# Patient Record
Sex: FEMALE | Race: WHITE | NOT HISPANIC OR LATINO | Employment: OTHER | URBAN - NONMETROPOLITAN AREA
[De-identification: names, ages, dates, MRNs, and addresses within clinical notes are randomized per-mention and may not be internally consistent; named-entity substitution may affect disease eponyms.]

---

## 2018-03-06 ENCOUNTER — OFFICE VISIT CONVERTED (OUTPATIENT)
Dept: FAMILY MEDICINE CLINIC | Age: 63
End: 2018-03-06
Attending: FAMILY MEDICINE

## 2018-04-11 ENCOUNTER — OFFICE VISIT (OUTPATIENT)
Dept: OBSTETRICS AND GYNECOLOGY | Age: 63
End: 2018-04-11

## 2018-04-11 VITALS
WEIGHT: 168 LBS | DIASTOLIC BLOOD PRESSURE: 74 MMHG | SYSTOLIC BLOOD PRESSURE: 124 MMHG | BODY MASS INDEX: 27 KG/M2 | HEIGHT: 66 IN

## 2018-04-11 DIAGNOSIS — K21.9 GASTROESOPHAGEAL REFLUX DISEASE, ESOPHAGITIS PRESENCE NOT SPECIFIED: ICD-10-CM

## 2018-04-11 DIAGNOSIS — Z01.419 ENCOUNTER FOR GYNECOLOGICAL EXAMINATION: Primary | ICD-10-CM

## 2018-04-11 DIAGNOSIS — Z00.00 ANNUAL PHYSICAL EXAM: ICD-10-CM

## 2018-04-11 DIAGNOSIS — M85.89 OSTEOPENIA OF MULTIPLE SITES: ICD-10-CM

## 2018-04-11 DIAGNOSIS — N94.19 DYSPAREUNIA DUE TO MEDICAL CONDITION IN FEMALE: ICD-10-CM

## 2018-04-11 PROCEDURE — 99396 PREV VISIT EST AGE 40-64: CPT | Performed by: OBSTETRICS & GYNECOLOGY

## 2018-04-11 RX ORDER — ESTRADIOL 0.1 MG/G
2 CREAM VAGINAL DAILY
Qty: 42.5 G | Refills: 1 | Status: SHIPPED | OUTPATIENT
Start: 2018-04-11 | End: 2023-02-27

## 2018-04-11 NOTE — PROGRESS NOTES
Subjective   Sharon Tolliver is a 63 y.o. female is being seen today for   Chief Complaint   Patient presents with   • Gynecologic Exam     Annual:mammo here today   .    History of Present Illness  Patient is here for an annual check.  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  The good news is she has met a new significant other been sexually active but there is some pain with sex over talked about that and I'll give her some Estrace cream I gave her sample told her how to take it.  Otherwise she doing fine with no really other complaints no change in medicines her bowels and bladder are working well and is no new family history  The following portions of the patient's history were reviewed and updated as appropriate: allergies, current medications, past family history, past medical history, past social history, past surgical history and problem list.    Vitals:    18 1015   BP: 124/74       PAST MEDICAL HISTORY  Past Medical History:   Diagnosis Date   • Chronic tension headaches    • Reflux esophagitis      OB History      Para Term  AB Living    2 2 2       SAB TAB Ectopic Molar Multiple Live Births                 Past Surgical History:   Procedure Laterality Date   • BREAST BIOPSY     •  SECTION     • COLONOSCOPY     • ESOPHAGOGASTRECTOMY       Family History   Problem Relation Age of Onset   • No Known Problems Mother    • No Known Problems Father    • No Known Problems Sister    • No Known Problems Brother    • No Known Problems Daughter    • No Known Problems Son    • No Known Problems Maternal Grandmother    • No Known Problems Paternal Grandmother    • No Known Problems Maternal Aunt    • No Known Problems Paternal Aunt    • BRCA 1/2 Neg Hx    • Breast cancer Neg Hx    • Colon cancer Neg Hx    • Endometrial cancer Neg Hx    • Ovarian cancer Neg Hx      History    Smoking Status   • Never Smoker   Smokeless Tobacco   • Never Used       Current Outpatient Prescriptions:   •  pantoprazole (PROTONIX) 40 MG EC tablet, , Disp: , Rfl:   •  sertraline (ZOLOFT) 50 MG tablet, , Disp: , Rfl:   •  estradiol (ESTRACE VAGINAL) 0.1 MG/GM vaginal cream, Insert 2 g into the vagina Daily., Disp: 42.5 g, Rfl: 1    There is no immunization history on file for this patient.    Review of Systems   Constitutional: Negative for chills, fatigue, fever and unexpected weight change.   Respiratory: Negative for shortness of breath and wheezing.    Cardiovascular: Negative for chest pain.   Gastrointestinal: Negative for abdominal distention, abdominal pain, blood in stool, constipation, diarrhea and nausea.   Genitourinary: Positive for dyspareunia. Negative for difficulty urinating, dysuria, frequency, hematuria, menstrual problem, pelvic pain, urgency and vaginal discharge.   Skin: Negative for rash.       Objective   Physical Exam   Constitutional: She is oriented to person, place, and time. Vital signs are normal. She appears well-developed and well-nourished.   Neck: No thyromegaly present.   Cardiovascular: Normal rate, regular rhythm and normal heart sounds.    Pulmonary/Chest: Effort normal. Right breast exhibits no inverted nipple, no mass, no nipple discharge, no skin change and no tenderness. Left breast exhibits no inverted nipple, no mass, no nipple discharge, no skin change and no tenderness. Breasts are symmetrical. There is no breast swelling.   Abdominal: Soft.   Genitourinary: Vagina normal and uterus normal. No breast tenderness, discharge or bleeding. Pelvic exam was performed with patient supine. No labial fusion. There is no rash, tenderness, lesion or injury on the right labia. There is no rash, tenderness, lesion or injury on the left labia. Cervix exhibits no motion tenderness, no discharge and no friability. Right adnexum displays no mass, no tenderness and no fullness. Left  adnexum displays no mass, no tenderness and no fullness.   Neurological: She is alert and oriented to person, place, and time.   Psychiatric: She has a normal mood and affect.   Vitals reviewed.        Assessment/Plan   Sharon was seen today for gynecologic exam.    Diagnoses and all orders for this visit:    Encounter for gynecological examination  -     IGP, Apt HPV,rfx 16 / 18,45 - ThinPrep Vial, Cervix    Gastroesophageal reflux disease, esophagitis presence not specified    Annual physical exam    Osteopenia of multiple sites    Dyspareunia due to medical condition in female    Other orders  -     estradiol (ESTRACE VAGINAL) 0.1 MG/GM vaginal cream; Insert 2 g into the vagina Daily.      My exam was normal today.  I did a Pap smear today.  Mammogram was done today.  Also did a bone density which showed a little worsening of her osteopenia whereupon repeat that in about 2-3 years.  Talked about that and the things to do for that.  Colonoscopy is up-to-date it was done in 15.  Diet and excise were discussed come back in year

## 2018-04-16 LAB
CYTOLOGIST CVX/VAG CYTO: NORMAL
CYTOLOGY CVX/VAG DOC THIN PREP: NORMAL
DX ICD CODE: NORMAL
HIV 1 & 2 AB SER-IMP: NORMAL
HPV I/H RISK 4 DNA CVX QL PROBE+SIG AMP: NEGATIVE
Lab: NORMAL
OTHER STN SPEC: NORMAL
PATH REPORT.FINAL DX SPEC: NORMAL
STAT OF ADQ CVX/VAG CYTO-IMP: NORMAL

## 2018-11-19 ENCOUNTER — OFFICE VISIT CONVERTED (OUTPATIENT)
Dept: FAMILY MEDICINE CLINIC | Age: 63
End: 2018-11-19
Attending: FAMILY MEDICINE

## 2019-02-22 ENCOUNTER — HOSPITAL ENCOUNTER (OUTPATIENT)
Dept: OTHER | Facility: HOSPITAL | Age: 64
Discharge: HOME | End: 2019-02-22
Attending: FAMILY MEDICINE

## 2019-02-22 LAB
ALT SERPL-CCNC: 10 U/L (ref 10–40)
CHOLEST SERPL-MCNC: 152 MG/DL (ref 107–200)
CHOLEST/HDLC SERPL: 2.7 {RATIO} (ref 3–6)
HDLC SERPL-MCNC: 56 MG/DL (ref 40–60)
LDLC SERPL CALC-MCNC: 78 MG/DL (ref 70–100)
TRIGL SERPL-MCNC: 90 MG/DL (ref 40–150)
VLDLC SERPL-MCNC: 18 MG/DL (ref 5–37)

## 2019-05-10 ENCOUNTER — OFFICE VISIT (OUTPATIENT)
Dept: OBSTETRICS AND GYNECOLOGY | Age: 64
End: 2019-05-10

## 2019-05-10 ENCOUNTER — PROCEDURE VISIT (OUTPATIENT)
Dept: OBSTETRICS AND GYNECOLOGY | Age: 64
End: 2019-05-10

## 2019-05-10 ENCOUNTER — APPOINTMENT (OUTPATIENT)
Dept: WOMENS IMAGING | Facility: HOSPITAL | Age: 64
End: 2019-05-10

## 2019-05-10 VITALS
DIASTOLIC BLOOD PRESSURE: 80 MMHG | BODY MASS INDEX: 27 KG/M2 | SYSTOLIC BLOOD PRESSURE: 138 MMHG | HEIGHT: 66 IN | WEIGHT: 168 LBS

## 2019-05-10 DIAGNOSIS — E78.00 HYPERCHOLESTEREMIA: ICD-10-CM

## 2019-05-10 DIAGNOSIS — Z12.31 VISIT FOR SCREENING MAMMOGRAM: Primary | ICD-10-CM

## 2019-05-10 DIAGNOSIS — Z00.00 ANNUAL PHYSICAL EXAM: Primary | ICD-10-CM

## 2019-05-10 PROCEDURE — 77067 SCR MAMMO BI INCL CAD: CPT | Performed by: RADIOLOGY

## 2019-05-10 PROCEDURE — 99396 PREV VISIT EST AGE 40-64: CPT | Performed by: OBSTETRICS & GYNECOLOGY

## 2019-05-10 PROCEDURE — 77067 SCR MAMMO BI INCL CAD: CPT | Performed by: OBSTETRICS & GYNECOLOGY

## 2019-05-10 RX ORDER — ATORVASTATIN CALCIUM 10 MG/1
10 TABLET, FILM COATED ORAL DAILY
COMMUNITY
Start: 2019-03-04 | End: 2021-08-25 | Stop reason: SDUPTHER

## 2019-05-10 RX ORDER — ZOSTER VACCINE RECOMBINANT, ADJUVANTED 50 MCG/0.5
KIT INTRAMUSCULAR
COMMUNITY
Start: 2019-05-09 | End: 2022-02-25 | Stop reason: SDUPTHER

## 2019-05-10 NOTE — PROGRESS NOTES
Subjective   Sharon Tolliver is a 64 y.o. female is being seen today for   Chief Complaint   Patient presents with   • Gynecologic Exam     AE and MG today.  2018 neg pap, neg HPV.  Dexa 2018 = osteopenia.  PM uses vaginal estradiol cream.  C-scope  or 2016  = normal, repeat in ten years.  New dx elevated cholesterol, started lipitor 10 mg this year.    .    History of Present Illness  Patient is here for her annual exam.  She is doing very well overall no changes during the year no medical problems.  Nothing new in the family bowels and bladder work well.  Still with her friend doing well with that and uses her Estrace cream which has helped a lot.  Still no job at this point but she is okay with that right now  The following portions of the patient's history were reviewed and updated as appropriate: allergies, current medications, past family history, past medical history, past social history, past surgical history and problem list.    Vitals:    05/10/19 1316   BP: 138/80       PAST MEDICAL HISTORY  Past Medical History:   Diagnosis Date   • Chronic tension headaches    • Diverticulosis    • Elevated cholesterol    • Reflux esophagitis      OB History      Para Term  AB Living    2 2 2     2    SAB TAB Ectopic Molar Multiple Live Births              2        Past Surgical History:   Procedure Laterality Date   • BREAST BIOPSY     •  SECTION     • COLONOSCOPY  2016    normal repeat in ten years.    • ESOPHAGOGASTRECTOMY     • UPPER GASTROINTESTINAL ENDOSCOPY       Family History   Problem Relation Age of Onset   • No Known Problems Mother    • No Known Problems Father    • No Known Problems Sister    • No Known Problems Brother    • No Known Problems Son    • No Known Problems Maternal Grandmother    • No Known Problems Paternal Grandmother    • No Known Problems Maternal Aunt    • No Known Problems Paternal Aunt    • No Known Problems Son    • BRCA 1/2 Neg Hx    • Breast cancer Neg  Hx    • Colon cancer Neg Hx    • Endometrial cancer Neg Hx    • Ovarian cancer Neg Hx      Social History     Tobacco Use   Smoking Status Never Smoker   Smokeless Tobacco Never Used       Current Outpatient Medications:   •  atorvastatin (LIPITOR) 10 MG tablet, Take 10 mg by mouth Daily., Disp: , Rfl:   •  estradiol (ESTRACE VAGINAL) 0.1 MG/GM vaginal cream, Insert 2 g into the vagina Daily., Disp: 42.5 g, Rfl: 1  •  pantoprazole (PROTONIX) 40 MG EC tablet, , Disp: , Rfl:   •  sertraline (ZOLOFT) 50 MG tablet, Take 50 mg by mouth Daily., Disp: , Rfl:   •  SHINGRIX 50 MCG/0.5ML reconstituted suspension, , Disp: , Rfl:     There is no immunization history on file for this patient.    Review of Systems   Constitutional: Negative for chills, fatigue, fever and unexpected weight change.   Respiratory: Negative for shortness of breath and wheezing.    Cardiovascular: Negative for chest pain.   Gastrointestinal: Negative for abdominal distention, abdominal pain, blood in stool, constipation, diarrhea and nausea.   Genitourinary: Negative for difficulty urinating, dyspareunia, dysuria, frequency, hematuria, menstrual problem, pelvic pain, urgency and vaginal discharge.   Skin: Negative for rash.       Objective   Physical Exam   Constitutional: She is oriented to person, place, and time. Vital signs are normal. She appears well-developed and well-nourished.   Neck: No thyromegaly present.   Cardiovascular: Normal rate, regular rhythm and normal heart sounds.   Pulmonary/Chest: Effort normal. Right breast exhibits no inverted nipple, no mass, no nipple discharge, no skin change and no tenderness. Left breast exhibits no inverted nipple, no mass, no nipple discharge, no skin change and no tenderness. Breasts are symmetrical. There is no breast swelling.   Abdominal: Soft.   Genitourinary: Vagina normal and uterus normal. No breast tenderness, discharge or bleeding. Pelvic exam was performed with patient supine. No labial  fusion. There is no rash, tenderness, lesion or injury on the right labia. There is no rash, tenderness, lesion or injury on the left labia. Cervix exhibits no motion tenderness, no discharge and no friability. Right adnexum displays no mass, no tenderness and no fullness. Left adnexum displays no mass, no tenderness and no fullness.   Neurological: She is alert and oriented to person, place, and time.   Psychiatric: She has a normal mood and affect.   Vitals reviewed.        Assessment/Plan   Sharon was seen today for gynecologic exam.    Diagnoses and all orders for this visit:    Annual physical exam    Hypercholesteremia      Normal exam today.  Pap smear done last year so not done today.  Mammogram was done today.  Bone density last year showed osteopenia will get one next year and colonoscopy was in 15 she is good for 10 years.  She thought she gained weight but she is the same as last year but she would like to lose and we can we talked about exercise etc.  Back in a year

## 2019-05-31 ENCOUNTER — OFFICE VISIT CONVERTED (OUTPATIENT)
Dept: FAMILY MEDICINE CLINIC | Age: 64
End: 2019-05-31
Attending: FAMILY MEDICINE

## 2019-09-26 ENCOUNTER — OFFICE VISIT CONVERTED (OUTPATIENT)
Dept: FAMILY MEDICINE CLINIC | Age: 64
End: 2019-09-26
Attending: FAMILY MEDICINE

## 2019-09-28 ENCOUNTER — HOSPITAL ENCOUNTER (OUTPATIENT)
Dept: OTHER | Facility: HOSPITAL | Age: 64
Discharge: HOME | End: 2019-09-28
Attending: FAMILY MEDICINE

## 2019-09-28 LAB
ALBUMIN SERPL-MCNC: 4.2 G/DL (ref 3.5–5)
ALBUMIN/GLOB SERPL: 1.4 {RATIO} (ref 1.4–2.6)
ALP SERPL-CCNC: 77 U/L (ref 43–160)
ALT SERPL-CCNC: 14 U/L (ref 10–40)
ANION GAP SERPL CALC-SCNC: 19 MMOL/L (ref 8–19)
AST SERPL-CCNC: 20 U/L (ref 15–50)
BASOPHILS # BLD MANUAL: 0.02 10*3/UL (ref 0–0.2)
BASOPHILS NFR BLD MANUAL: 0.2 % (ref 0–3)
BILIRUB SERPL-MCNC: 0.49 MG/DL (ref 0.2–1.3)
BUN SERPL-MCNC: 12 MG/DL (ref 5–25)
BUN/CREAT SERPL: 12 {RATIO} (ref 6–20)
CALCIUM SERPL-MCNC: 9.3 MG/DL (ref 8.7–10.4)
CHLORIDE SERPL-SCNC: 103 MMOL/L (ref 99–111)
CHOLEST SERPL-MCNC: 141 MG/DL (ref 107–200)
CHOLEST/HDLC SERPL: 2.6 {RATIO} (ref 3–6)
CONV CO2: 22 MMOL/L (ref 22–32)
CONV TOTAL PROTEIN: 7.2 G/DL (ref 6.3–8.2)
CREAT UR-MCNC: 0.99 MG/DL (ref 0.5–0.9)
DEPRECATED RDW RBC AUTO: 40.9 FL
EOSINOPHIL # BLD MANUAL: 0.06 10*3/UL (ref 0–0.7)
EOSINOPHIL NFR BLD MANUAL: 0.6 % (ref 0–7)
ERYTHROCYTE [DISTWIDTH] IN BLOOD BY AUTOMATED COUNT: 12.4 % (ref 11.5–14.5)
GFR SERPLBLD BASED ON 1.73 SQ M-ARVRAT: 60 ML/MIN/{1.73_M2}
GLOBULIN UR ELPH-MCNC: 3 G/DL (ref 2–3.5)
GLUCOSE SERPL-MCNC: 91 MG/DL (ref 65–99)
GRANS (ABSOLUTE): 8.28 10*3/UL (ref 2–8)
GRANS: 83.7 % (ref 30–85)
HBA1C MFR BLD: 13.4 G/DL (ref 12–16)
HCT VFR BLD AUTO: 41.6 % (ref 37–47)
HDLC SERPL-MCNC: 54 MG/DL (ref 40–60)
IMM GRANULOCYTES # BLD: 0.01 10*3/UL (ref 0–0.54)
IMM GRANULOCYTES NFR BLD: 0.1 % (ref 0–0.43)
LDLC SERPL CALC-MCNC: 71 MG/DL (ref 70–100)
LYMPHOCYTES # BLD MANUAL: 0.92 10*3/UL (ref 1–5)
LYMPHOCYTES NFR BLD MANUAL: 6.1 % (ref 3–10)
MCH RBC QN AUTO: 28.4 PG (ref 27–31)
MCHC RBC AUTO-ENTMCNC: 32.2 G/DL (ref 33–37)
MCV RBC AUTO: 88.1 FL (ref 81–99)
MONOCYTES # BLD AUTO: 0.6 10*3/UL (ref 0.2–1.2)
OSMOLALITY SERPL CALC.SUM OF ELEC: 289 MOSM/KG (ref 273–304)
PLATELET # BLD AUTO: 203 10*3/UL (ref 130–400)
PMV BLD AUTO: 9.7 FL (ref 7.4–10.4)
POTASSIUM SERPL-SCNC: 4.2 MMOL/L (ref 3.5–5.3)
RBC # BLD AUTO: 4.72 10*6/UL (ref 4.2–5.4)
SODIUM SERPL-SCNC: 140 MMOL/L (ref 135–147)
TRIGL SERPL-MCNC: 81 MG/DL (ref 40–150)
TSH SERPL-ACNC: 1.08 M[IU]/L (ref 0.27–4.2)
VARIANT LYMPHS NFR BLD MANUAL: 9.3 % (ref 20–45)
VLDLC SERPL-MCNC: 16 MG/DL (ref 5–37)
WBC # BLD AUTO: 9.89 10*3/UL (ref 4.8–10.8)

## 2020-05-19 ENCOUNTER — OFFICE VISIT CONVERTED (OUTPATIENT)
Dept: FAMILY MEDICINE CLINIC | Age: 65
End: 2020-05-19
Attending: FAMILY MEDICINE

## 2020-09-28 ENCOUNTER — OFFICE VISIT CONVERTED (OUTPATIENT)
Dept: FAMILY MEDICINE CLINIC | Age: 65
End: 2020-09-28
Attending: FAMILY MEDICINE

## 2020-10-03 ENCOUNTER — HOSPITAL ENCOUNTER (OUTPATIENT)
Dept: OTHER | Facility: HOSPITAL | Age: 65
Discharge: HOME | End: 2020-10-03
Attending: FAMILY MEDICINE

## 2020-10-03 LAB
ALBUMIN SERPL-MCNC: 4.3 G/DL (ref 3.5–5)
ALBUMIN/GLOB SERPL: 1.4 {RATIO} (ref 1.4–2.6)
ALP SERPL-CCNC: 70 U/L (ref 43–160)
ALT SERPL-CCNC: 13 U/L (ref 10–40)
ANION GAP SERPL CALC-SCNC: 16 MMOL/L (ref 8–19)
AST SERPL-CCNC: 18 U/L (ref 15–50)
BASOPHILS # BLD AUTO: 0.02 10*3/UL (ref 0–0.2)
BASOPHILS NFR BLD AUTO: 0.4 % (ref 0–3)
BILIRUB SERPL-MCNC: 0.43 MG/DL (ref 0.2–1.3)
BUN SERPL-MCNC: 18 MG/DL (ref 5–25)
BUN/CREAT SERPL: 17 {RATIO} (ref 6–20)
CALCIUM SERPL-MCNC: 9.4 MG/DL (ref 8.7–10.4)
CHLORIDE SERPL-SCNC: 103 MMOL/L (ref 99–111)
CHOLEST SERPL-MCNC: 174 MG/DL (ref 107–200)
CHOLEST/HDLC SERPL: 2.8 {RATIO} (ref 3–6)
CONV ABS IMM GRAN: 0.01 10*3/UL (ref 0–0.2)
CONV CO2: 25 MMOL/L (ref 22–32)
CONV IMMATURE GRAN: 0.2 % (ref 0–1.8)
CONV TOTAL PROTEIN: 7.3 G/DL (ref 6.3–8.2)
CREAT UR-MCNC: 1.09 MG/DL (ref 0.5–0.9)
DEPRECATED RDW RBC AUTO: 42 FL (ref 36.4–46.3)
EOSINOPHIL # BLD AUTO: 0.07 10*3/UL (ref 0–0.7)
EOSINOPHIL # BLD AUTO: 1.4 % (ref 0–7)
ERYTHROCYTE [DISTWIDTH] IN BLOOD BY AUTOMATED COUNT: 12.7 % (ref 11.7–14.4)
GFR SERPLBLD BASED ON 1.73 SQ M-ARVRAT: 53 ML/MIN/{1.73_M2}
GLOBULIN UR ELPH-MCNC: 3 G/DL (ref 2–3.5)
GLUCOSE SERPL-MCNC: 87 MG/DL (ref 65–99)
HCT VFR BLD AUTO: 43.5 % (ref 37–47)
HDLC SERPL-MCNC: 62 MG/DL (ref 40–60)
HGB BLD-MCNC: 13.3 G/DL (ref 12–16)
LDLC SERPL CALC-MCNC: 100 MG/DL (ref 70–100)
LYMPHOCYTES # BLD AUTO: 1.62 10*3/UL (ref 1–5)
LYMPHOCYTES NFR BLD AUTO: 33.1 % (ref 20–45)
MCH RBC QN AUTO: 27.9 PG (ref 27–31)
MCHC RBC AUTO-ENTMCNC: 30.6 G/DL (ref 33–37)
MCV RBC AUTO: 91.2 FL (ref 81–99)
MONOCYTES # BLD AUTO: 0.41 10*3/UL (ref 0.2–1.2)
MONOCYTES NFR BLD AUTO: 8.4 % (ref 3–10)
NEUTROPHILS # BLD AUTO: 2.76 10*3/UL (ref 2–8)
NEUTROPHILS NFR BLD AUTO: 56.5 % (ref 30–85)
NRBC CBCN: 0 % (ref 0–0.7)
OSMOLALITY SERPL CALC.SUM OF ELEC: 289 MOSM/KG (ref 273–304)
PLATELET # BLD AUTO: 213 10*3/UL (ref 130–400)
PMV BLD AUTO: 10.7 FL (ref 9.4–12.3)
POTASSIUM SERPL-SCNC: 4.5 MMOL/L (ref 3.5–5.3)
RBC # BLD AUTO: 4.77 10*6/UL (ref 4.2–5.4)
SODIUM SERPL-SCNC: 139 MMOL/L (ref 135–147)
TRIGL SERPL-MCNC: 58 MG/DL (ref 40–150)
TSH SERPL-ACNC: 1.91 M[IU]/L (ref 0.27–4.2)
VLDLC SERPL-MCNC: 12 MG/DL (ref 5–37)
WBC # BLD AUTO: 4.89 10*3/UL (ref 4.8–10.8)

## 2020-10-06 ENCOUNTER — TELEPHONE (OUTPATIENT)
Dept: OBSTETRICS AND GYNECOLOGY | Age: 65
End: 2020-10-06

## 2020-10-06 NOTE — TELEPHONE ENCOUNTER
Former Sonya Pt    Pt called to schedule AE and requesting Dr. Gaming.     Dr. Gaming, are you able to accept this pt and have them scheduled with you?    [phone]

## 2020-10-07 NOTE — TELEPHONE ENCOUNTER
I can see her in my next available annual spot - please put new patient/Sonya per JOSE in notes on schedule please

## 2021-05-14 ENCOUNTER — APPOINTMENT (OUTPATIENT)
Dept: WOMENS IMAGING | Facility: HOSPITAL | Age: 66
End: 2021-05-14

## 2021-05-14 PROCEDURE — 77067 SCR MAMMO BI INCL CAD: CPT | Performed by: RADIOLOGY

## 2021-05-14 PROCEDURE — 77063 BREAST TOMOSYNTHESIS BI: CPT | Performed by: RADIOLOGY

## 2021-05-18 NOTE — PROGRESS NOTES
Sharon TolliverEliana 1955     Office/Outpatient Visit    Visit Date: Thu, Sep 26, 2019 04:13 pm    Provider: Emigdio Mcwilliams MD (Assistant: Karina Neal MA)    Location: [facility]        Electronically signed by Emigdio Mcwilliams MD on  2019 08:03:02 AM                             SUBJECTIVE:        CC: physical exam         HPI:     Sharon is in today for wellness exam.  She is 64 years old and [place] in [place] where she has been since  of this very year.  She does not smoke.  She drinks almost no alcohol.  She is up to date on screening mammogram and colonoscopy.  She does need to have some blood work done.  She remains on cholesterol lowering medication at this time.     ROS:     CONSTITUTIONAL:  Negative for chills and fever.      CARDIOVASCULAR:  Negative for chest pain and palpitations.      RESPIRATORY:  Negative for recent cough and dyspnea.      GASTROINTESTINAL:  Negative for abdominal pain, nausea and vomiting.      INTEGUMENTARY/BREAST:  Negative for atypical mole(s) and rash.          [facility]/:     Last Reviewed on 2019 11:34 AM by Emigdio Mcwilliams    Past Medical History:                 PAST MEDICAL HISTORY         Gastroesophageal Reflux Disease     Migraine Headaches     Depression    Anxiety         PREVENTIVE HEALTH MAINTENANCE             BONE DENSITY: was last done 3-4 years ago osteopenia     COLORECTAL CANCER SCREENING: Up to date (colonoscopy q10y; sigmoidoscopy q5y; Cologuard q3y) was last done 2014, Results are in chart; colonoscopy with normal results; The next colonoscopy is due  2024; Diverticulosis     MAMMOGRAM: Done within last 2 years and results in are chart was last done 2019 with normal results     PAP SMEAR: was last done 2016 with normal results         Surgical History:         Cholecystectomy    : X 2;         Family History:     Father: Alzheimer's Disease; Cerebrovascular Accident     Mother:  Osteoarthritis         Social History:     Occupation: Unemployed     Marital Status:      Children: 2 children         Tobacco/Alcohol/Supplements:     Last Reviewed on 5/31/2019 11:34 AM by Emigdio Mcwilliams    Tobacco: She has never smoked.  Non-drinker         Substance Abuse History:     Last Reviewed on 5/31/2019 11:34 AM by Emigdio Mcwilliams    NEGATIVE         Mental Health History:     Last Reviewed on 5/31/2019 11:34 AM by Emigdio Mcwilliams        Communicable Diseases (eg STDs):     Last Reviewed on 5/31/2019 11:34 AM by Emigdio Mcwilliams            Current Problems:     Last Reviewed on 5/31/2019 11:34 AM by Emigdio Mcwilliams    Abnormal laboratory test findings without diagnosis     Elevated cholesterol     Common migraine     Depression     GERD         Immunizations:     Havrix -adult dose (HepA) 5/23/2018     Fluvirin (4 + years dose) 10/1/2014     Fluzone (3 + years dose) 10/1/2018     Influenza Virus Vaccine pf (adult) 11/15/2017     Influenza Virus Vaccine, unspecified formulation 10/1/2016     Shingrix (Zoster recombinant) 5/9/2019         Allergies:     Last Reviewed on 5/31/2019 11:34 AM by Emigdio Mcwilliams      No Known Drug Allergies.         Current Medications:     Last Reviewed on 5/31/2019 11:34 AM by Emigdio Mcwilliams    Lipitor 10mg Tablet Take 1 tablet(s) by mouth q hs     Zoloft 50mg Tablet one and one-half  q day     Imitrex 100mg Tablet Take 1 tablet(s) by mouth prn for migraine, maximum of two tablets in one 24hour period     Pantoprazole 40mg Tablets, Delayed Release Take 1 tablet(s) by mouth daily     Estradiol 0.1mg/1gm Vaginal Cream one applicator 1 times a week         OBJECTIVE:        Vitals:         Current: 9/26/2019 4:17:12 PM    Ht:  5 ft, 6.5 in;  Wt: 170.6 lbs;  BMI: 27.1    T: 97.8 F (oral);  BP: 144/81 mm Hg (right arm, sitting);  P: 72 bpm (right arm (BP Cuff), sitting);  sCr: 1.06 mg/dL;  GFR: 57.94        Repeat:      4:49:25 PM     BP:   132/88mm Hg (right arm, sitting)         Exams:     PHYSICAL EXAM:     GENERAL: vital signs recorded - well developed, well nourished;  no apparent distress;     EYES: extraocular movements intact; conjunctiva and cornea are normal; PERRL;     E/N/T:  normal EACs, TMs, nasal/oral mucosa, teeth, gingiva, and oropharynx;     NECK: range of motion is normal; thyroid is non-palpable;     RESPIRATORY: normal respiratory rate and pattern with no distress; normal breath sounds with no rales, rhonchi, wheezes or rubs;     CARDIOVASCULAR: normal rate; rhythm is regular;  no systolic murmur; no edema;     GASTROINTESTINAL: nontender; normal bowel sounds; no organomegaly;     LYMPHATIC: no enlargement of cervical or facial nodes; no supraclavicular nodes;     BREAST/INTEGUMENT: SKIN: no significant rashes or lesions; no suspicious moles;     NEUROLOGIC: mental status: alert and oriented x 3; cranial nerves II-XII grossly intact;     PSYCHIATRIC: appropriate affect and demeanor; normal psychomotor function;         ASSESSMENT           V70.0   Z00.00  Yearly physical exam              DDx:         ORDERS:         Meds Prescribed:       Refill of: Lipitor (Atorvastatin Calcium) 10mg Tablet Take 1 tablet(s) by mouth q hs  #90 (Ninety) tablet(s) Refills: 3       Refill of: Zoloft (Sertraline HCl) 50mg Tablet one and one-half  q day  #135 (One [employer] and [address]) tablet(s) Refills: 3         Radiology/Test Orders:       3014F  Screening mammography results documented and reviewed (PV)1  (In-House)         3017F  Colorectal CA screen results documented and reviewed (PV)  (In-House)           Lab Orders:       [id number]  [facility] PHYSICAL: CMP, CBC, TSH, LIPID: [id number], [id number], [id number], [id number]  (Send-Out)           Other Orders:         Depression screen negative  (In-House)                   PLAN:          Yearly physical exam     LABORATORY:  Labs ordered to be performed today include PHYSICAL PANEL; CMP,  CBC, TSH, LIPID.      RECOMMENDATIONS given include: Today, we have reviewed Sharon's care.  She remains in good health overall.  We will repeat her blood pressure prior to leaving.  She will have fasting labs at her convenience.  No changes are anticipated..  MIPS PHQ-9 Depression Screening: Completed form scanned and in chart; Total Score 1; Negative Depression Screen           Orders:       [id number]  [facility] PHYSICAL: CMP, CBC, TSH, LIPID: [id number], [id number], [id number], [id number]  (Send-Out)           Depression screen negative  (In-House)         3014F  Screening mammography results documented and reviewed (PV)1  (In-House)         3017F  Colorectal CA screen results documented and reviewed (PV)  (In-House)             Patient Education Handouts:       Physical Exam 60+ year, Female              Other Prescriptions:       Refill of: Lipitor (Atorvastatin Calcium) 10mg Tablet Take 1 tablet(s) by mouth q hs  #90 (Ninety) tablet(s) Refills: 3       Refill of: Zoloft (Sertraline HCl) 50mg Tablet one and one-half  q day  #135 (One [place] [address]) tablet(s) Refills: 3         CHARGE CAPTURE           **Please note: ICD descriptions below are intended for billing purposes only and may not represent clinical diagnoses**        Primary Diagnosis:         V70.0 Yearly physical exam            Z00.00    Encntr for general adult medical exam w/o abnormal findings              Orders:          [id number]   Preventive medicine, established patient, age 40-64 years  (In-House)                Depression screen negative  (In-House)             3014F   Screening mammography results documented and reviewed (PV)1  (In-House)             3017F   Colorectal CA screen results documented and reviewed (PV)  (In-House)

## 2021-05-18 NOTE — PROGRESS NOTES
Unruly Sharon J  1955     Office/Outpatient Visit    Visit Date: Tue, May 19, 2020 02:13 pm    Provider: Emigdio Mcwilliams MD (Assistant: Sonia Glasgow LPN)    Location: [facility]        Electronically signed by Emigdio Mcwilliams MD on  05/20/2020 06:53:38 AM                             Subjective:        CC: concern about returning to work        TELEMEDICINE VISIT:    - Sharon consented to this telemedicine visit.    - Persons present during the telemedicine consultation include:  Sharon - patient, Dr. Mcwilliams    - This visit is being conducted over telephone with    HPI:       Sharon wanted to have some discussion about possible return to work.  She is scheduled to return to work next week and is concerned.  She is concerned in part because of her age.  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  They are also checking temperatures and symptoms on entry.    ROS:     CONSTITUTIONAL:  Negative for chills and fever.      CARDIOVASCULAR:  Negative for chest pain and palpitations.      RESPIRATORY:  Negative for recent cough and dyspnea.      GASTROINTESTINAL:  Negative for abdominal pain, nausea and vomiting.      INTEGUMENTARY/BREAST:  Negative for atypical mole(s) and rash.          Past Medical History / Family History / Social History:         Last Reviewed on 5/19/2020 02:25 PM by Emigdio Mcwilliams    Past Medical History:                 PAST MEDICAL HISTORY         Gastroesophageal Reflux Disease     Migraine Headaches     Depression    Anxiety         PREVENTIVE HEALTH MAINTENANCE             BONE DENSITY: was last done 3-4 years ago osteopenia     COLORECTAL CANCER SCREENING: Up to date (colonoscopy q10y; sigmoidoscopy q5y; Cologuard q3y) was last done 06/2014, Results are in chart; colonoscopy with normal results; The next colonoscopy is due  06/2024; Diverticulosis     MAMMOGRAM: Done within last 2 years and results  in are chart was last done 2019 with normal results     PAP SMEAR: was last done 2016 with normal results         Surgical History:         Cholecystectomy    : X 2;         Family History:     Father: Alzheimer's Disease; Cerebrovascular Accident     Mother: Osteoarthritis         Social History:     Occupation: Unemployed     Marital Status:      Children: 2 children         Tobacco/Alcohol/Supplements:     Last Reviewed on 2020 02:25 PM by Emigdio Mcwilliams    Tobacco: She has never smoked.  Non-drinker         Substance Abuse History:     Last Reviewed on 2020 02:25 PM by Emigdio Mcwilliams    NEGATIVE         Mental Health History:     Last Reviewed on 2020 02:25 PM by Emigdio Mcwilliams        Communicable Diseases (eg STDs):     Last Reviewed on 2020 02:25 PM by Emigdio Mcwilliams        Current Problems:     Last Reviewed on 2020 02:25 PM by Emigdio Mcwilliams    Gastro-esophageal reflux disease without esophagitis    Major depressive disorder, single episode, mild    Migraine without aura, not intractable, without status migrainosus    Hyperlipidemia, unspecified    Abnormal finding of blood chemistry, unspecified        Immunizations:     Havrix -adult dose (HepA) 2018    Fluvirin (4 + years dose) 10/1/2014    Fluzone (3 + years dose) 10/1/2018    Influenza Virus Vaccine pf (adult) 11/15/2017    Influenza Virus Vaccine, unspecified formulation 10/1/2016    Shingrix (Zoster recombinant) 2019    Influenza, high dose seasonal 10/23/2019        Allergies:     Last Reviewed on 2020 02:25 PM by Emigdio Mcwilliams    No Known Allergies.        Current Medications:     Last Reviewed on 2020 02:25 PM by Emigdio Mcwilliams    Imitrex 100mg Tablet [Take 1 tablet(s) by mouth prn for migraine, maximum of two tablets in one 24hour period]    Zoloft 50mg Tablet [one and one-half  q day]    Estradiol 0.1mg/1gm Vaginal Cream  [one applicator 1 times a week]    Lipitor 10mg Tablet [Take 1 tablet(s) by mouth q hs]        Assessment:         Z00.8   Encounter for other general examination           Plan:         Encounter for other general examination        RECOMMENDATIONS given include: Today, we have reviewed this situation.  We discussed the potential risks of return to work.  I have stressed to her that she should wear a mask and maintain distance as possible in her work place.  We agreed that she would not work this week and assess the situtation.  Again, her main risk factor for severe disease with COVID-19 is age.  She expressed understanding..  Total time spent was 7 minutes; 10310--Xxfmhngzu E/M 5-10 minutes             Charge Capture:         Primary Diagnosis:     Z00.8  Encounter for other general examination           Orders:      [id number]  Phys/QHP telephone evaluation 5-10 min  (In-House)

## 2021-05-18 NOTE — PROGRESS NOTES
Sharon Tolliver J  1955     Office/Outpatient Visit    Visit Date: Mon, Sep 28, 2020 03:53 pm    Provider: Emigdio Mcwilliams MD (Assistant: Radha Elder RN)    Location: [facility]        Electronically signed by Emigdio Mcwilliams MD on  09/30/2020 11:02:40 AM                             Subjective:        CC: physical exam, cholesterol, depression, migraine headaches    HPI:       Sharon is in today for wellness exam.  She is 65 years old and [place] in [place] where she has been since June of 2019.  She does not smoke.  She drinks almost no alcohol.  She is up to date on screening mammogram and colonoscopy.          Sharon also has a history of depression for which she remains on sertraline.  She has been doing well emotionally.  She currently takes 75mg daily of this.  She has been on this dose for about 5 years.  She was going through a difficult season at that time.  She may be interested in weaning off of this over time.            Sharon also has history of migraine headaches for which she has previously used Imitrex.  She uses this only rarely though.          Concerning hyperlipidemia, unspecified, current treatment includes Lipitor.  Compliance with treatment has been good; she takes her medication as directed and follows up as directed.  She denies experiencing any hypercholesterolemia related symptoms.      ROS:     CONSTITUTIONAL:  Negative for chills and fever.      CARDIOVASCULAR:  Negative for chest pain and palpitations.      RESPIRATORY:  Negative for recent cough and dyspnea.      GASTROINTESTINAL:  Negative for abdominal pain, nausea and vomiting.      INTEGUMENTARY/BREAST:  Negative for atypical mole(s) and rash.          Past Medical History / Family History / Social History:         Last Reviewed on 5/19/2020 02:25 PM by Emigdio Mcwilliams    Past Medical History:                 PAST MEDICAL HISTORY         Gastroesophageal Reflux Disease     Migraine  Headaches     Depression    Anxiety         PREVENTIVE HEALTH MAINTENANCE             BONE DENSITY: was last done 3-4 years ago osteopenia     COLORECTAL CANCER SCREENING: Up to date (colonoscopy q10y; sigmoidoscopy q5y; Cologuard q3y) was last done 2014, Results are in chart; colonoscopy with normal results; The next colonoscopy is due  2024; Diverticulosis     MAMMOGRAM: Done within last 2 years and results in are chart was last done 2019 with normal results     PAP SMEAR: was last done 2016 with normal results         Surgical History:         Cholecystectomy    : X 2;         Family History:     Father: Alzheimer's Disease; Cerebrovascular Accident     Mother: Osteoarthritis         Social History:     Occupation: Unemployed     Marital Status:      Children: 2 children         Tobacco/Alcohol/Supplements:     Last Reviewed on 2020 02:25 PM by Emigdio Mcwilliams    Tobacco: She has never smoked.  Non-drinker         Substance Abuse History:     Last Reviewed on 2020 02:25 PM by Emigdio Mcwilliams    NEGATIVE         Mental Health History:     Last Reviewed on 2020 02:25 PM by Emigdio Mcwilliams        Communicable Diseases (eg STDs):     Last Reviewed on 2020 02:25 PM by Emigdio Mcwilliams        Current Problems:     Last Reviewed on 2020 02:25 PM by Emigdio Mcwilliams    Gastro-esophageal reflux disease without esophagitis    Major depressive disorder, single episode, mild    Migraine without aura, not intractable, without status migrainosus    Hyperlipidemia, unspecified    Abnormal finding of blood chemistry, unspecified    Encounter for other general examination        Immunizations:     Influenza, high dose seasonal 10/23/2019    Havrix -adult dose (HepA) 2018    Fluvirin (4 + years dose) 10/1/2014    Fluzone (3 + years dose) 10/1/2018    Influenza Virus Vaccine pf (adult) 11/15/2017    Influenza Virus Vaccine, unspecified  formulation 10/1/2016    Shingrix (Zoster recombinant) 5/9/2019        Allergies:     Last Reviewed on 5/19/2020 02:25 PM by Emigdio Mcwilliams    No Known Allergies.        Current Medications:     Last Reviewed on 5/19/2020 02:25 PM by Emigdio Mcwilliams    Imitrex 100mg Tablet [Take 1 tablet(s) by mouth prn for migraine, maximum of two tablets in one 24hour period]    Zoloft 50mg Tablet [one and one-half  q day]    Estradiol 0.1mg/1gm Vaginal Cream [one applicator 1 times a week]    Lipitor 10mg Tablet [Take 1 tablet(s) by mouth q hs]        Objective:        Vitals:         Current: 9/28/2020 3:59:18 PM    Ht:  5 ft, 6.5 in;  Wt: 171.8 lbs;  BMI: 27.3T: 96.9 F (temporal);  BP: 117/76 mm Hg (right arm, sitting);  P: 67 bpm (right arm (BP Cuff), sitting);  sCr: 0.99 mg/dL;  GFR: 61.42        Exams:     PHYSICAL EXAM:     GENERAL: vital signs recorded - well developed, well nourished;  no apparent distress;     EYES: extraocular movements intact; conjunctiva and cornea are normal; PERRL;     E/N/T:  normal EACs, TMs, nasal/oral mucosa, teeth, gingiva, and oropharynx;     NECK: range of motion is normal; thyroid is non-palpable;     RESPIRATORY: normal respiratory rate and pattern with no distress; normal breath sounds with no rales, rhonchi, wheezes or rubs;     CARDIOVASCULAR: normal rate; rhythm is regular;  no systolic murmur; no edema;     GASTROINTESTINAL: nontender; normal bowel sounds; no organomegaly;     LYMPHATIC: no enlargement of cervical or facial nodes; no supraclavicular nodes;     BREAST/INTEGUMENT: SKIN: no significant rashes or lesions; no suspicious moles;     NEUROLOGIC: mental status: alert and oriented x 3; cranial nerves II-XII grossly intact;     PSYCHIATRIC: appropriate affect and demeanor; normal psychomotor function;         Assessment:         Z00.01   Encounter for general adult medical examination with abnormal findings       F32.0   Major depressive disorder, single episode,  mild       G43.009   Migraine without aura, not intractable, without status migrainosus       E78.5   Hyperlipidemia, unspecified       Z23   Encounter for immunization           ORDERS:         Meds Prescribed:       [Refilled] Lipitor 10 mg oral tablet [Take 1 tablet(s) by mouth q hs], #90 (ninety) tablets, Refills: 3 (three)       [Refilled] Zoloft 50 mg oral tablet [one and one-half  q day], #135 (one hundred and thirty five) tablets, Refills: 3 (three)         Radiology/Test Orders:       3017F  Colorectal CA screen results documented and reviewed (PV)  (In-House)              Lab Orders:       [id number]  [facility] PHYSICAL: CMP, CBC, TSH, LIPID: [id number], [id number], [id number], [id number]  (Send-Out)              Procedures Ordered:       71230  Fluzone High Dose  (In-House)            36821  Immunization administration; one vaccine  (In-House)              Other Orders:         Depression screen negative  (In-House)            1101F  Pt screen for fall risk; document no falls in past year or only 1 fall w/o injury in past year (ALEKSANDER)  (In-House)              Screening mammogram results documented  (Send-Out)            1123F  Advance Care Planning discussed and doc; advance care plan or surrogate decision maker doc. in MR  (Send-Out)                      Plan:         Encounter for general adult medical examination with abnormal findings    LABORATORY:  Labs ordered to be performed today include PHYSICAL PANEL; CMP, CBC, TSH, LIPID.      RECOMMENDATIONS given include: Today, we have again reviewed her care.  I'm going to refill her medications and update labs.  She is basically up to date on screenings, but I did recommend she get mammogram at her convenience.  Otherwise, no short term changes are anticipated.  I am going to refill sertraline for her, but I did ask her to try to slowly come off of it.  I would recommend she take only a whole tab daily for the next month or so, and then a half tab daily for a similar  amount of time.  If she comes off of it fine.  If not, then she will continue it as is..  MIPS PHQ-9 Depression Screening: Completed form scanned and in chart; Total Score 1; Negative Depression Screen           Orders:       99387  [facility] PHYSICAL: CMP, CBC, TSH, LIPID: [id number], [id number], [id number], [id number]  (Send-Out)              Depression screen negative  (In-House)            1101F  Pt screen for fall risk; document no falls in past year or only 1 fall w/o injury in past year (ALEKSANDER)  (In-House)              Screening mammogram results documented  (Send-Out)            3017F  Colorectal CA screen results documented and reviewed (PV)  (In-House)            1123F  Advance Care Planning discussed and doc; advance care plan or surrogate decision maker doc. in MR  (Send-Out)              Major depressive disorder, single episode, mildAs above.          Prescriptions:       [Refilled] Lipitor 10 mg oral tablet [Take 1 tablet(s) by mouth q hs], #90 (ninety) tablets, Refills: 3 (three)       [Refilled] Zoloft 50 mg oral tablet [one and one-half  q day], #135 (one hundred and thirty five) tablets, Refills: 3 (three)         Migraine without aura, not intractable, without status migrainosusAs above.        Hyperlipidemia, unspecifiedAs above.        Encounter for immunization          Immunizations:       39351  Fluzone High Dose  (In-House)                Dose (ml): 0.7  Site: left deltoid  Route: intramuscular  Administered by: Radha Elder          : [employer]  Lot #: [id number]  Exp: 06/30/2021          NDC: [address]-85      50657  Immunization administration; one vaccine  (In-House)                  Charge Capture:         Primary Diagnosis:     Z00.01  Encounter for general adult medical examination with abnormal findings           Orders:      63068  Preventive medicine, established patient, age 65+ years  (In-House)              Depression screen negative  (In-House)            1101F   Pt screen for fall risk; document no falls in past year or only 1 fall w/o injury in past year (ALEKSANDER)  (In-House)            3017F  Colorectal CA screen results documented and reviewed (PV)  (In-House)              F32.0  Major depressive disorder, single episode, mild     G43.009  Migraine without aura, not intractable, without status migrainosus     E78.5  Hyperlipidemia, unspecified     Z23  Encounter for immunization           Orders:      94026  Fluzone High Dose  (In-House)            56127  Immunization administration; one vaccine  (In-House)

## 2021-05-18 NOTE — PROGRESS NOTES
Unruly Sharon J. 1955     Office/Outpatient Visit    Visit Date: Tue, Mar 6, 2018 09:42 am    Provider: Emigdio Mcwilliams MD (Assistant: Neda Gonzales MA)    Location: [facility]        Electronically signed by Emigdio Mcwilliams MD on  2018 02:41:44 PM                             SUBJECTIVE:        CC: cough         HPI:         Cough noted.  It has been present for the past one week.  Respiratory symptoms include chest congestion and cough.  She denies body aches and fever.  Sputum is described as scant.  She reports recent exposure to illness from co-workers.  She was actually feeling better on  and then got worse yesterday.     ROS:     CONSTITUTIONAL:  Negative for chills and fever.      CARDIOVASCULAR:  Negative for chest pain and palpitations.      GASTROINTESTINAL:  Negative for abdominal pain, nausea and vomiting.          [facility]/:     Last Reviewed on 3/06/2018 09:58 AM by Emigdio Mcwilliams    Past Medical History:                 PAST MEDICAL HISTORY         Gastroesophageal Reflux Disease     Migraine Headaches     Depression    Anxiety         PREVENTIVE HEALTH MAINTENANCE             BONE DENSITY: was last done 3-4 years ago osteopenia     COLORECTAL CANCER SCREENING: Up to date (colonoscopy q10y; sigmoidoscopy q5y; Cologuard q3y) was last done 2014, Results are in chart; colonoscopy with normal results; The next colonoscopy is due  2024; Diverticulosis     MAMMOGRAM: Done within last 2 years and results in are chart was last done 10/20/16 with normal results     PAP SMEAR: was last done 2016 with normal results         Surgical History:         Cholecystectomy    : X 2;         Family History:         Positive for Alzheimer's Disease ( father ) and Cerebrovascular Accident ( father ).      Positive for Osteoarthritis ( mother ).          Social History:     Occupation: [employer]     Marital Status:      Children: 2 children          Tobacco/Alcohol/Supplements:     Last Reviewed on 3/06/2018 09:58 AM by Emigdio Mcwilliams    Tobacco: She has never smoked.  Non-drinker         Substance Abuse History:     Last Reviewed on 3/06/2018 09:58 AM by Emigdio Mcwilliams    NEGATIVE         Mental Health History:     Last Reviewed on 3/06/2018 09:58 AM by Emigdio Mcwilliams        Communicable Diseases (eg STDs):     Last Reviewed on 3/06/2018 09:58 AM by Emigdio Mcwilliams            Current Problems:     Last Reviewed on 3/06/2018 09:58 AM by Emigdio Mcwilliams    Abnormal laboratory test findings without diagnosis     Elevated cholesterol     Depression     Cough         Immunizations:     Fluvirin (4 + years dose) 10/1/2014     Influenza Virus Vaccine pf (adult) 11/15/2017     Influenza Virus Vaccine, unspecified formulation 10/1/2016         Allergies:     Last Reviewed on 3/06/2018 09:58 AM by Emigdio Mcwilliams      No Known Drug Allergies.         Current Medications:     Last Reviewed on 3/06/2018 09:58 AM by Emigdio Mcwilliams    Imitrex 100mg Tablet Take 1 tablet(s) by mouth prn for migraine, maximum of two tablets in one 24hour period     Pantoprazole 40mg Tablets, Delayed Release Take 1 tablet(s) by mouth daily     Zoloft 50mg Tablet one and one-half  q day         OBJECTIVE:        Vitals:         Current: 3/6/2018 9:44:00 AM    Ht:  5 ft, 6.5 in;  Wt: 166.3 lbs;  BMI: 26.4    T: 99.1 F (oral);  BP: 133/72 mm Hg (left arm, sitting);  P: 102 bpm (left arm (BP Cuff), sitting);  sCr: 1 mg/dL;  GFR: 61.52        Exams:     PHYSICAL EXAM:     GENERAL: vital signs recorded - well developed, well nourished;  no apparent distress;     EYES: extraocular movements intact; conjunctiva and cornea are normal; PERRLA;     E/N/T:  normal EACs, TMs, nasal/oral mucosa, teeth, gingiva, and oropharynx;     NECK: range of motion is normal; thyroid is non-palpable;     RESPIRATORY: normal respiratory rate and pattern with no  distress; normal breath sounds with no rales, rhonchi, wheezes or rubs; she has a deep - almost barking cough     CARDIOVASCULAR: normal rate; rhythm is regular;  no systolic murmur; no edema;     GASTROINTESTINAL: nontender; normal bowel sounds; no organomegaly;     BREAST/INTEGUMENT: SKIN: no significant rashes or lesions; no suspicious moles;         Lab/Test Results:             Influenza A and B:  Negative (03/06/2018),     Performed by::  tls (03/06/2018),             ASSESSMENT           786.2   R05  Cough              DDx:         ORDERS:         Meds Prescribed:       Cefdinir 300mg Capsules Take 1 capsule(s) by mouth q12h for 10 days  #20 (Twenty) capsule(s) Refills: 0       Prednisone 20mg Tablet Take  2 tablet(s) by mouth qd  #10 (Ten) tablet(s) Refills: 0       Promethazine with Dextromethrophan 6.25mg/15mg per 5ml Syrup Take 1 to 2 teaspoon by mouth q 4 to 6 hr as needed for cough  #4 (Four) oz Refills: 1         Lab Orders:       [id number]  Infectious agent antigen detection by immunoassay; Influenza  (In-House)         72320-12  Infectious agent antigen detection by immunoassay; Influenza  (In-House)                   PLAN:          Cough     LABORATORY:  Labs ordered to be performed today include Flu A&B Flu A Flu B.      RECOMMENDATIONS given include: We will cover Sharon as noted below.  I think this is becoming more progressive.  We will use antibiotics because of that.  If she has the flu, we will call her..            Prescriptions:       Cefdinir 300mg Capsules Take 1 capsule(s) by mouth q12h for 10 days  #20 (Twenty) capsule(s) Refills: 0       Prednisone 20mg Tablet Take  2 tablet(s) by mouth qd  #10 (Ten) tablet(s) Refills: 0       Promethazine with Dextromethrophan 6.25mg/15mg per 5ml Syrup Take 1 to 2 teaspoon by mouth q 4 to 6 hr as needed for cough  #4 (Four) oz Refills: 1           Orders:       28951  Infectious agent antigen detection by immunoassay; Influenza  (In-House)          20175-51  Infectious agent antigen detection by immunoassay; Influenza  (In-House)             Patient Education Handouts:       Acute Bronchitis              CHARGE CAPTURE           **Please note: ICD descriptions below are intended for billing purposes only and may not represent clinical diagnoses**        Primary Diagnosis:         786.2 Cough            R05    Cough              Orders:          13748   Office/outpatient visit; established patient, level 3  (In-House)             32935   Infectious agent antigen detection by immunoassay; Influenza  (In-House)             50893 -59  Infectious agent antigen detection by immunoassay; Influenza  (In-House)

## 2021-05-18 NOTE — PROGRESS NOTES
Sharon Tolliver 1955     Office/Outpatient Visit    Visit Date: Fri, May 31, 2019 11:07 am    Provider: Emigdio Mcwilliams MD (Assistant: Radha Elder RN)    Location: [facility]        Electronically signed by Emigdio Mcwilliams MD on  05/31/2019 07:16:52 PM                             SUBJECTIVE:        CC: cholesterol, GERD, migraine headaches         HPI:         Ms. Tolliver presents with elevated cholesterol.  Current treatment includes Lipitor.  Compliance with treatment has been good; she takes her medication as directed and follows up as directed.  She denies experiencing any hypercholesterolemia related symptoms.          She also has history of GERD for which she remains on Protonix.  She denies acute issue with this presently.  She is trying to move over to ranitidine.         She does have history of migraine headaches.  However, those have been doing well for her most recently.  She does take Imitrex as needed for this.  It has been 2-3 months since she has used this though.     ROS:     CONSTITUTIONAL:  Negative for chills and fever.      CARDIOVASCULAR:  Negative for chest pain and palpitations.      RESPIRATORY:  Negative for recent cough and dyspnea.      GASTROINTESTINAL:  Negative for abdominal pain, nausea and vomiting.          [facility]/SH:     Last Reviewed on 5/31/2019 11:34 AM by Emigdio Mcwilliams    Past Medical History:                 PAST MEDICAL HISTORY         Gastroesophageal Reflux Disease     Migraine Headaches     Depression    Anxiety         PREVENTIVE HEALTH MAINTENANCE             BONE DENSITY: was last done 3-4 years ago osteopenia     COLORECTAL CANCER SCREENING: Up to date (colonoscopy q10y; sigmoidoscopy q5y; Cologuard q3y) was last done 06/2014, Results are in chart; colonoscopy with normal results; The next colonoscopy is due  06/2024; Diverticulosis     MAMMOGRAM: Done within last 2 years and results in are chart was last done 04/2018 with normal  results     PAP SMEAR: was last done 2016 with normal results         Surgical History:         Cholecystectomy    : X 2;         Family History:     Father: Alzheimer's Disease; Cerebrovascular Accident     Mother: Osteoarthritis         Social History:     Occupation: Unemployed     Marital Status:      Children: 2 children         Tobacco/Alcohol/Supplements:     Last Reviewed on 2019 11:34 AM by Emigdio Mcwilliams    Tobacco: She has never smoked.  Non-drinker         Substance Abuse History:     Last Reviewed on 2019 11:34 AM by Emigdio Mcwilliams    NEGATIVE         Mental Health History:     Last Reviewed on 2019 11:34 AM by Emigdio Mcwilliams        Communicable Diseases (eg STDs):     Last Reviewed on 2019 11:34 AM by Emigdio Mcwilliams            Current Problems:     Last Reviewed on 2019 11:34 AM by Emigdio Mcwilliams    Abnormal laboratory test findings without diagnosis     Elevated cholesterol     Depression         Immunizations:     Havrix -adult dose (HepA) 2018     Fluvirin (4 + years dose) 10/1/2014     Fluzone (3 + years dose) 10/1/2018     Influenza Virus Vaccine pf (adult) 11/15/2017     Influenza Virus Vaccine, unspecified formulation 10/1/2016     Shingrix (Zoster recombinant) 2019         Allergies:     Last Reviewed on 2019 11:34 AM by Emigdio Mcwilliams      No Known Drug Allergies.         Current Medications:     Last Reviewed on 2019 11:34 AM by Emigdio Mcwilliams    Lipitor 10mg Tablet Take 1 tablet(s) by mouth q hs     Imitrex 100mg Tablet Take 1 tablet(s) by mouth prn for migraine, maximum of two tablets in one 24hour period     Pantoprazole 40mg Tablets, Delayed Release Take 1 tablet(s) by mouth daily     Zoloft 50mg Tablet one and one-half  q day     Estradiol 0.1mg/1gm Vaginal Cream one applicator 1 times a week         OBJECTIVE:        Vitals:         Current: 2019 11:11:07 AM     Ht:  5 ft, 6.5 in;  Wt: 166.2 lbs;  BMI: 26.4    T: 98.4 F (oral);  BP: 111/71 mm Hg (right arm, sitting);  P: 76 bpm (right arm (BP Cuff), sitting);  sCr: 1.06 mg/dL;  GFR: 57.30        Exams:     PHYSICAL EXAM:     GENERAL: vital signs recorded - well developed, well nourished;  no apparent distress;     EYES: extraocular movements intact; conjunctiva and cornea are normal; PERRLA;     E/N/T:  normal EACs, TMs, nasal/oral mucosa, teeth, gingiva, and oropharynx;     NECK: range of motion is normal; thyroid is non-palpable;     RESPIRATORY: normal respiratory rate and pattern with no distress; normal breath sounds with no rales, rhonchi, wheezes or rubs;     CARDIOVASCULAR: normal rate; rhythm is regular;  no systolic murmur; no edema;     GASTROINTESTINAL: nontender; normal bowel sounds; no organomegaly;     LYMPHATIC: no enlargement of cervical or facial nodes; no supraclavicular nodes;     BREAST/INTEGUMENT: SKIN: no significant rashes or lesions; no suspicious moles;     NEUROLOGIC: mental status: alert and oriented x 3; cranial nerves II-XII grossly intact;     PSYCHIATRIC: appropriate affect and demeanor; normal psychomotor function;         ASSESSMENT           272.0   E78.5  Elevated cholesterol              DDx:     530.81   K21.9  GERD              DDx:     346.10   G43.009  Common migraine              DDx:     296.20   F32.0  Depression              DDx:         ORDERS:         Meds Prescribed:       Refill of: Lipitor (Atorvastatin Calcium) 10mg Tablet Take 1 tablet(s) by mouth q hs  #90 (Ninety) tablet(s) Refills: 3       Refill of: Zoloft (Sertraline HCl) 50mg Tablet one and one-half  q day  #135 (One [place] and Thirty Five) tablet(s) Refills: 3         Radiology/Test Orders:       3014F  Screening mammography results documented and reviewed (PV)1  (In-House)         3017F  Colorectal CA screen results documented and reviewed (PV)  (In-House)                   PLAN:          Elevated cholesterol          RECOMMENDATIONS given include: Sharon seems well today.  We will update refills.  She did have blood work in February that looked good.  We will send her needed medications for a year and contact her in January for wellness exam..  MIPS Vaccines Flu and Pneumonia updated in Shot record     MAMMOGRAM: Done within last 2 years and results in are chart     COLORECTAL CANCER SCREENING: Results are in chart           Prescriptions:       Refill of: Lipitor (Atorvastatin Calcium) 10mg Tablet Take 1 tablet(s) by mouth q hs  #90 (Ninety) tablet(s) Refills: 3           Orders:       3014F  Screening mammography results documented and reviewed (PV)1  (In-House)         3017F  Colorectal CA screen results documented and reviewed (PV)  (In-House)             Patient Education Handouts:       Hyperlipidemia (Hyperlipoproteinemia)           GERD As above.          Common migraine As above.          Depression           Prescriptions:       Refill of: Zoloft (Sertraline HCl) 50mg Tablet one and one-half  q day  #135 (One [place] and Thirty Five) tablet(s) Refills: 3             CHARGE CAPTURE           **Please note: ICD descriptions below are intended for billing purposes only and may not represent clinical diagnoses**        Primary Diagnosis:         272.0 Elevated cholesterol            E78.5    Hyperlipidemia, unspecified              Orders:          56252   Office/outpatient visit; established patient, level 4  (In-House)             3014F   Screening mammography results documented and reviewed (PV)1  (In-House)             3017F   Colorectal CA screen results documented and reviewed (PV)  (In-House)           530.81 GERD            K21.9    Gastro-esophageal reflux disease without esophagitis    346.10 Common migraine            G43.009    Migraine without aura, not intractable, without status migrainosus    296.20 Depression            F32.0    Major depressive disorder, single episode, mild

## 2021-05-18 NOTE — PROGRESS NOTES
Sharon Tolliver 1955     Office/Outpatient Visit    Visit Date:  09:29 am    Provider: Emigdio Mcwilliams MD (Assistant: Radha Elder RN)    Location: [facility]        Electronically signed by Emigdio Mcwilliams MD on  2018 06:27:16 AM                             SUBJECTIVE:        CC: physical exam         HPI:     Sharon is in today for wellness exam.  She is 63 years old and works at [employer].  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  She will only be there another 3 weeks.  She does not smoke or use alcohol.  She had colonoscopy in  which showed some diverticulosis.  She remains up to date on mammogram which she has done through her gynecologist in [place].     ROS:     CONSTITUTIONAL:  Negative for chills and fever.      CARDIOVASCULAR:  Negative for chest pain and palpitations.      RESPIRATORY:  Negative for recent cough and dyspnea.      GASTROINTESTINAL:  Negative for abdominal pain, nausea and vomiting.      INTEGUMENTARY/BREAST:  Negative for atypical mole(s) and rash.          PMH/FMH/SH:     Last Reviewed on 3/06/2018 09:58 AM by Emigdio Mcwilliams    Past Medical History:                 PAST MEDICAL HISTORY         Gastroesophageal Reflux Disease     Migraine Headaches     Depression    Anxiety         PREVENTIVE HEALTH MAINTENANCE             BONE DENSITY: was last done 3-4 years ago osteopenia     COLORECTAL CANCER SCREENING: Up to date (colonoscopy q10y; sigmoidoscopy q5y; Cologuard q3y) was last done 2014, Results are in chart; colonoscopy with normal results; The next colonoscopy is due  2024; Diverticulosis     MAMMOGRAM: Done within last 2 years and results in are chart was last done 10/20/16 with normal results     PAP SMEAR: was last done 2016 with normal results         Surgical History:         Cholecystectomy    : X 2;         Family History:         Positive for  Alzheimer's Disease ( father ) and Cerebrovascular Accident ( father ).      Positive for Osteoarthritis ( mother ).          Social History:     Occupation: [employer]     Marital Status:      Children: 2 children         Tobacco/Alcohol/Supplements:     Last Reviewed on 3/06/2018 09:58 AM by Emigdio Mcwilliams    Tobacco: She has never smoked.  Non-drinker         Substance Abuse History:     Last Reviewed on 3/06/2018 09:58 AM by Emigdio Mcwilliams    NEGATIVE         Mental Health History:     Last Reviewed on 3/06/2018 09:58 AM by Emigdio Mcwilliams        Communicable Diseases (eg STDs):     Last Reviewed on 3/06/2018 09:58 AM by Emigdio Mcwilliams            Current Problems:     Last Reviewed on 3/06/2018 09:58 AM by Emigdio Mcwilliams    Abnormal laboratory test findings without diagnosis     Elevated cholesterol     Depression         Immunizations:     Havrix -adult dose (HepA) 5/23/2018     Fluvirin (4 + years dose) 10/1/2014     Influenza Virus Vaccine pf (adult) 11/15/2017     Influenza Virus Vaccine, unspecified formulation 10/1/2016         Allergies:     Last Reviewed on 11/19/2018 09:31 AM by Radha Elder      No Known Drug Allergies.         Current Medications:     Last Reviewed on 11/19/2018 09:33 AM by Radha Elder    Imitrex 100mg Tablet Take 1 tablet(s) by mouth prn for migraine, maximum of two tablets in one 24hour period     Pantoprazole 40mg Tablets, Delayed Release Take 1 tablet(s) by mouth daily     Zoloft 50mg Tablet one and one-half  q day     Estradiol 0.1mg/1gm Vaginal Cream one applicator 1 times a week         OBJECTIVE:        Vitals:         Current: 11/19/2018 9:31:16 AM    Ht:  5 ft, 6.5 in;  Wt: 173.6 lbs;  BMI: 27.6    T: 98.1 F (oral);  BP: 126/71 mm Hg (right arm, sitting);  P: 82 bpm (right arm (BP Cuff), sitting);  sCr: 1 mg/dL;  GFR: 62.66        Exams:     PHYSICAL EXAM:     GENERAL: vital signs recorded - well developed, well  nourished;  no apparent distress;     EYES: extraocular movements intact; conjunctiva and cornea are normal; PERRLA;     E/N/T:  normal EACs, TMs, nasal/oral mucosa, teeth, gingiva, and oropharynx;     NECK: range of motion is normal; thyroid is non-palpable;     RESPIRATORY: normal respiratory rate and pattern with no distress; normal breath sounds with no rales, rhonchi, wheezes or rubs;     CARDIOVASCULAR: normal rate; rhythm is regular;  no systolic murmur; no edema;     GASTROINTESTINAL: nontender; normal bowel sounds; no organomegaly;     LYMPHATIC: no enlargement of cervical or facial nodes; no supraclavicular nodes;     BREAST/INTEGUMENT: SKIN: no significant rashes or lesions; no suspicious moles;     NEUROLOGIC: mental status: alert and oriented x 3; cranial nerves II-XII grossly intact;     PSYCHIATRIC:  appropriate affect and demeanor; normal speech pattern; grossly normal memory;         ASSESSMENT           V70.0   Z00.00  Yearly physical exam              DDx:         ORDERS:         Meds Prescribed:       Refill of: Imitrex (Sumatriptan) 100mg Tablet Take 1 tablet(s) by mouth prn for migraine, maximum of two tablets in one 24hour period  #9 (Nine) tablet(s) Refills: 3       Refill of: Pantoprazole 40mg Tablets, Delayed Release Take 1 tablet(s) by mouth daily  #90 (Ninety) tablet(s) Refills: 3       Refill of: Zoloft (Sertraline HCl) 50mg Tablet one and one-half  q day  #135 (One [place] and Thirty Five) tablet(s) Refills: 3         Radiology/Test Orders:       3017F  Colorectal CA screen results documented and reviewed (PV)  (In-House)           Lab Orders:       [id number]  [facility] PHYSICAL: CMP, CBC, TSH, LIPID: [id number], [id number], [id number], [id number]  (Send-Out)         50340  [facility] Urinalysis, automated, with micro  (Send-Out)           Other Orders:         Depression screen negative  (In-House)           Negative EtOH screen  (In-House)           Calculated BMI above the upper parameter  and a follow-up plan was documented in the medical record  (In-House)                   PLAN:          Yearly physical exam     LABORATORY:  Labs ordered to be performed today include PHYSICAL PANEL; CMP, CBC, TSH, LIPID and urinalysis with micro.      RECOMMENDATIONS given include: Sharon seems well today.  We will arrange blood work.  She is up to date on most vaccines for now.  Shingrix is sent to her pharmacy.  We discussed Protonix, and she will try to wean off of it and onto ranitidine.  However, if she is not able to do so, then we will accept the potential risks of the medication.  It is effective for her.  We will contact Dr. Hassan for copy of her most recent mammogram.  She is otherwise up to date on cancer screenings.  Because the job is ending, we will not make a change regarding Zoloft at least for now..  MIPS PHQ-9 Depression Screening: Completed form scanned and in chart; Total Score 2 Negative alcohol screen     COLORECTAL CANCER SCREENING: Results are in chart     BMI Elevated - Follow-Up Plan: She was provided education on weight loss strategies           Orders:         Depression screen negative  (In-House)           Negative EtOH screen  (In-House)         3017F  Colorectal CA screen results documented and reviewed (PV)  (In-House)           Calculated BMI above the upper parameter and a follow-up plan was documented in the medical record  (In-House)         [id number]  [facility] PHYSICAL: CMP, CBC, TSH, LIPID: [id number], [id number], [id number], 68532  (Send-Out)         41657  [facility], automated, with micro  (Send-Out)             Patient Education Handouts:       Physical Exam 60+ year, Female              Other Prescriptions:       Refill of: Imitrex (Sumatriptan) 100mg Tablet Take 1 tablet(s) by mouth prn for migraine, maximum of two tablets in one 24hour period  #9 (Nine) tablet(s) Refills: 3       Refill of: Pantoprazole 40mg Tablets, Delayed Release Take 1 tablet(s) by  mouth daily  #90 (Ninety) tablet(s) Refills: 3 - kidney damage, bone thinning, memory issues       Refill of: Zoloft (Sertraline HCl) 50mg Tablet one and one-half  q day  #135 (One [place] and Thirty Five) tablet(s) Refills: 3         CHARGE CAPTURE           **Please note: ICD descriptions below are intended for billing purposes only and may not represent clinical diagnoses**        Primary Diagnosis:         V70.0 Yearly physical exam            Z00.00    Encntr for general adult medical exam w/o abnormal findings              Orders:          [id number]   Preventive medicine, established patient, age 40-64 years  (In-House)                Depression screen negative  (In-House)                Negative EtOH screen  (In-House)             3017F   Colorectal CA screen results documented and reviewed (PV)  (In-House)                Calculated BMI above the upper parameter and a follow-up plan was documented in the medical record  (In-House)

## 2021-07-01 VITALS
WEIGHT: 170.6 LBS | BODY MASS INDEX: 26.78 KG/M2 | DIASTOLIC BLOOD PRESSURE: 88 MMHG | HEART RATE: 72 BPM | HEIGHT: 67 IN | SYSTOLIC BLOOD PRESSURE: 132 MMHG | TEMPERATURE: 97.8 F

## 2021-07-01 VITALS
HEART RATE: 76 BPM | WEIGHT: 166.2 LBS | BODY MASS INDEX: 26.09 KG/M2 | DIASTOLIC BLOOD PRESSURE: 71 MMHG | TEMPERATURE: 98.4 F | SYSTOLIC BLOOD PRESSURE: 111 MMHG | HEIGHT: 67 IN

## 2021-07-01 VITALS
BODY MASS INDEX: 26.1 KG/M2 | TEMPERATURE: 99.1 F | WEIGHT: 166.3 LBS | HEART RATE: 102 BPM | DIASTOLIC BLOOD PRESSURE: 72 MMHG | SYSTOLIC BLOOD PRESSURE: 133 MMHG | HEIGHT: 67 IN

## 2021-07-01 VITALS
BODY MASS INDEX: 27.25 KG/M2 | WEIGHT: 173.6 LBS | SYSTOLIC BLOOD PRESSURE: 126 MMHG | HEART RATE: 82 BPM | DIASTOLIC BLOOD PRESSURE: 71 MMHG | HEIGHT: 67 IN | TEMPERATURE: 98.1 F

## 2021-07-02 VITALS
BODY MASS INDEX: 26.97 KG/M2 | HEART RATE: 67 BPM | TEMPERATURE: 96.9 F | HEIGHT: 67 IN | WEIGHT: 171.8 LBS | SYSTOLIC BLOOD PRESSURE: 117 MMHG | DIASTOLIC BLOOD PRESSURE: 76 MMHG

## 2021-08-25 NOTE — TELEPHONE ENCOUNTER
Caller: [facility]     Relationship:     Best call back number: [phone]    Medication needed:   Requested Prescriptions     Pending Prescriptions Disp Refills   • atorvastatin (LIPITOR) 10 MG tablet       Sig: Take 1 tablet by mouth Daily.       When do you need the refill by: ASAP     What additional details did the patient provide when requesting the medication: PATIENT HAS CHANGED PHARMACY AND IS NOW USING [facility] IN [place],     Does the patient have less than a 3 day supply:  [x] Yes  [] No    What is the patient's preferred pharmacy:    [facility]   [address] [place] [address]  [id number]

## 2021-08-26 RX ORDER — ATORVASTATIN CALCIUM 10 MG/1
10 TABLET, FILM COATED ORAL DAILY
Qty: 90 TABLET | Refills: 0 | Status: SHIPPED | OUTPATIENT
Start: 2021-08-26 | End: 2021-11-29

## 2021-08-26 RX ORDER — ATORVASTATIN CALCIUM 10 MG/1
10 TABLET, FILM COATED ORAL DAILY
Qty: 90 TABLET | Refills: 0 | Status: SHIPPED | OUTPATIENT
Start: 2021-08-26 | End: 2021-08-26 | Stop reason: SDUPTHER

## 2021-08-26 NOTE — TELEPHONE ENCOUNTER
Caller: Sharon Tolliver    Relationship: Self    Best call back number: [phone]    Medication needed:   Requested Prescriptions     Pending Prescriptions Disp Refills   • atorvastatin (LIPITOR) 10 MG tablet 90 tablet 0     Sig: Take 1 tablet by mouth Daily.       When do you need the refill by: ASAP    What additional details did the patient provide when requesting the medication: PATIENT STATED THAT [facility] HAS BEEN OUT OF MEDICATION FOR 2 DAYS    Does the patient have less than a 3 day supply:  [] Yes  [x] No    What is the patient's preferred pharmacy:      [facility]   [address] [place] [address]  [id number]    PATIENT STATED: CONCERN THAT THIS MAY HAVE BEEN SENT TO [facility] AND IS WANTED TO BE SURE THIS IS SENT TO THE [facility] LISTED ABOVE

## 2021-08-26 NOTE — TELEPHONE ENCOUNTER
I did authorize refill for 90 days.  I would recommend follow up visit near the end of that time for check up and refills.  Thanks.

## 2021-11-29 RX ORDER — ATORVASTATIN CALCIUM 10 MG/1
TABLET, FILM COATED ORAL
Qty: 90 TABLET | Refills: 0 | Status: SHIPPED | OUTPATIENT
Start: 2021-11-29 | End: 2022-02-25 | Stop reason: SDUPTHER

## 2022-02-25 ENCOUNTER — LAB (OUTPATIENT)
Dept: LAB | Facility: HOSPITAL | Age: 67
End: 2022-02-25

## 2022-02-25 ENCOUNTER — OFFICE VISIT (OUTPATIENT)
Dept: FAMILY MEDICINE CLINIC | Age: 67
End: 2022-02-25

## 2022-02-25 VITALS
HEIGHT: 66 IN | SYSTOLIC BLOOD PRESSURE: 119 MMHG | TEMPERATURE: 98.2 F | DIASTOLIC BLOOD PRESSURE: 69 MMHG | WEIGHT: 164.6 LBS | HEART RATE: 67 BPM | BODY MASS INDEX: 26.45 KG/M2

## 2022-02-25 DIAGNOSIS — E78.00 HYPERCHOLESTEREMIA: ICD-10-CM

## 2022-02-25 DIAGNOSIS — Z11.59 NEED FOR HEPATITIS C SCREENING TEST: ICD-10-CM

## 2022-02-25 DIAGNOSIS — F41.8 DEPRESSION WITH ANXIETY: ICD-10-CM

## 2022-02-25 DIAGNOSIS — E78.00 HYPERCHOLESTEREMIA: Primary | ICD-10-CM

## 2022-02-25 LAB — HCV AB SER DONR QL: NORMAL

## 2022-02-25 PROCEDURE — 84443 ASSAY THYROID STIM HORMONE: CPT

## 2022-02-25 PROCEDURE — 36415 COLL VENOUS BLD VENIPUNCTURE: CPT

## 2022-02-25 PROCEDURE — 86803 HEPATITIS C AB TEST: CPT

## 2022-02-25 PROCEDURE — 80053 COMPREHEN METABOLIC PANEL: CPT

## 2022-02-25 PROCEDURE — 80061 LIPID PANEL: CPT

## 2022-02-25 PROCEDURE — 99214 OFFICE O/P EST MOD 30 MIN: CPT | Performed by: FAMILY MEDICINE

## 2022-02-25 RX ORDER — ALENDRONATE SODIUM 70 MG/1
1 TABLET ORAL WEEKLY
COMMUNITY
Start: 2022-02-07

## 2022-02-25 RX ORDER — ATORVASTATIN CALCIUM 10 MG/1
10 TABLET, FILM COATED ORAL NIGHTLY
Qty: 90 TABLET | Refills: 3 | Status: SHIPPED | OUTPATIENT
Start: 2022-02-25 | End: 2023-02-27 | Stop reason: SDUPTHER

## 2022-02-25 NOTE — PATIENT INSTRUCTIONS
"High Cholesterol    High cholesterol is a condition in which the blood has high levels of a white, waxy substance similar to fat (cholesterol). The liver makes all the cholesterol that the body needs. The human body needs small amounts of cholesterol to help build cells. A person gets extra or excess cholesterol from the food that he or she eats.  The blood carries cholesterol from the liver to the rest of the body. If you have high cholesterol, deposits (plaques) may build up on the walls of your arteries. Arteries are the blood vessels that carry blood away from your heart. These plaques make the arteries narrow and stiff.  Cholesterol plaques increase your risk for heart attack and stroke. Work with your health care provider to keep your cholesterol levels in a healthy range.  What increases the risk?  The following factors may make you more likely to develop this condition:  · Eating foods that are high in animal fat (saturated fat) or cholesterol.  · Being overweight.  · Not getting enough exercise.  · A family history of high cholesterol (familial hypercholesterolemia).  · Use of tobacco products.  · Having diabetes.  What are the signs or symptoms?  There are no symptoms of this condition.  How is this diagnosed?  This condition may be diagnosed based on the results of a blood test.  · If you are older than 20 years of age, your health care provider may check your cholesterol levels every 4-6 years.  · You may be checked more often if you have high cholesterol or other risk factors for heart disease.  The blood test for cholesterol measures:  · \"Bad\" cholesterol, or LDL cholesterol. This is the main type of cholesterol that causes heart disease. The desired level is less than 100 mg/dL.  · \"Good\" cholesterol, or HDL cholesterol. HDL helps protect against heart disease by cleaning the arteries and carrying the LDL to the liver for processing. The desired level for HDL is 60 mg/dL or higher.  · Triglycerides. " These are fats that your body can store or burn for energy. The desired level is less than 150 mg/dL.  · Total cholesterol. This measures the total amount of cholesterol in your blood and includes LDL, HDL, and triglycerides. The desired level is less than 200 mg/dL.  How is this treated?  This condition may be treated with:  · Diet changes. You may be asked to eat foods that have more fiber and less saturated fats or added sugar.  · Lifestyle changes. These may include regular exercise, maintaining a healthy weight, and quitting use of tobacco products.  · Medicines. These are given when diet and lifestyle changes have not worked. You may be prescribed a statin medicine to help lower your cholesterol levels.  Follow these instructions at home:  Eating and drinking    · Eat a healthy, balanced diet. This diet includes:  ? Daily servings of a variety of fresh, frozen, or canned fruits and vegetables.  ? Daily servings of whole grain foods that are rich in fiber.  ? Foods that are low in saturated fats and trans fats. These include poultry and fish without skin, lean cuts of meat, and low-fat dairy products.  ? A variety of fish, especially oily fish that contain omega-3 fatty acids. Aim to eat fish at least 2 times a week.  · Avoid foods and drinks that have added sugar.  · Use healthy cooking methods, such as roasting, grilling, broiling, baking, poaching, steaming, and stir-frying. Do not adhikari your food except for stir-frying.    Lifestyle    · Get regular exercise. Aim to exercise for a total of 150 minutes a week. Increase your activity level by doing activities such as gardening, walking, and taking the stairs.  · Do not use any products that contain nicotine or tobacco, such as cigarettes, e-cigarettes, and chewing tobacco. If you need help quitting, ask your health care provider.    General instructions  · Take over-the-counter and prescription medicines only as told by your health care provider.  · Keep all  "follow-up visits as told by your health care provider. This is important.  Where to find more information  · American Heart Association: www.heart.org  · National Heart, Lung, and Blood [place]: www.nhlbi.nih.gov  Contact a health care provider if:  · You have trouble achieving or maintaining a healthy diet or weight.  · You are starting an exercise program.  · You are unable to stop smoking.  Get help right away if:  · You have chest pain.  · You have trouble breathing.  · You have any symptoms of a stroke. \"BE FAST\" is an easy way to remember the main warning signs of a stroke:  ? B - Balance. Signs are dizziness, sudden trouble walking, or loss of balance.  ? E - Eyes. Signs are trouble seeing or a sudden change in vision.  ? F - Face. Signs are sudden weakness or numbness of the face, or the face or eyelid drooping on one side.  ? A - Arms. Signs are weakness or numbness in an arm. This happens suddenly and usually on one side of the body.  ? S - Speech. Signs are sudden trouble speaking, slurred speech, or trouble understanding what people say.  ? T - Time. Time to call emergency services. Write down what time symptoms started.  · You have other signs of a stroke, such as:  ? A sudden, severe headache with no known cause.  ? Nausea or vomiting.  ? Seizure.  These symptoms may represent a serious problem that is an emergency. Do not wait to see if the symptoms will go away. Get medical help right away. Call your local emergency services (911 in the [place] Do not drive yourself to the hospital.  Summary  · Cholesterol plaques increase your risk for heart attack and stroke. Work with your health care provider to keep your cholesterol levels in a healthy range.  · Eat a healthy, balanced diet, get regular exercise, and maintain a healthy weight.  · Do not use any products that contain nicotine or tobacco, such as cigarettes, e-cigarettes, and chewing tobacco.  · Get help right away if you have any symptoms of a " stroke.  This information is not intended to replace advice given to you by your health care provider. Make sure you discuss any questions you have with your health care provider.  Document Revised: 11/16/2020 Document Reviewed: 11/16/2020  Elsevier Patient Education © 2021 [employer]

## 2022-02-25 NOTE — PROGRESS NOTES
Sharon Tolliver presents to [facility].    Chief Complaint:  Cholesterol, depression    Subjective   {Problem List  Visit Diagnosis   Encounters  Notes  Medications  Labs  Result Review Imaging  Media :23}     History of Present Illness:  Sharon is in today for follow-up on her care.  She has elevated cholesterol for which she remains on generic Lipitor.  She has been on this for some time and tolerates it fairly well.  She is due for blood work which we will do today.    She also has depression and anxiety for which she continues to take generic Zoloft.  She is currently taking 25 mg daily.  She feels well on this dose.  She may consider trying to stop the medication.  She says she feels well otherwise.      Review of Systems:  Review of Systems   Constitutional: Negative for chills and fever.   Respiratory: Negative for cough and shortness of breath.    Cardiovascular: Negative for chest pain and palpitations.   Gastrointestinal: Negative for abdominal pain, nausea and vomiting.        Objective   Medical History:  Past Medical History:   • Chronic tension headaches   • Diverticulosis   • Elevated cholesterol   • Postmenopausal osteoporosis   • Reflux esophagitis     Past Surgical History:   • BREAST BIOPSY   •  SECTION   • COLONOSCOPY    Normal   • ESOPHAGOGASTRECTOMY   • UPPER GASTROINTESTINAL ENDOSCOPY      Family History   Problem Relation Age of Onset   • No Known Problems Mother    • No Known Problems Father    • No Known Problems Sister    • No Known Problems Brother    • No Known Problems Son    • No Known Problems Maternal Grandmother    • No Known Problems Paternal Grandmother    • No Known Problems Maternal Aunt    • No Known Problems Paternal Aunt    • No Known Problems Son    • BRCA 1/2 Neg Hx    • Breast cancer Neg Hx    • Colon cancer Neg Hx    • Endometrial cancer Neg Hx    • Ovarian cancer Neg Hx      Social History     Tobacco Use   • Smoking  "status: Never Smoker   • Smokeless tobacco: Never Used   Substance Use Topics   • Alcohol use: No       Health Maintenance Due   Topic Date Due   • DXA SCAN  Never done   • TDAP/TD VACCINES (1 - Tdap) Never done   • HEPATITIS C SCREENING  Never done   • ANNUAL PHYSICAL  05/11/2020   • PAP SMEAR  04/11/2021   • COVID-19 Vaccine (3 - Booster for Moderna series) 09/01/2021   • Pneumococcal Vaccine 65+ (2 of 2 - PPSV23) 09/11/2021   • LIPID PANEL  10/03/2021   • ZOSTER VACCINE (2 of 2) 01/26/2022        Immunization History   Administered Date(s) Administered   • COVID-19 (MODERNA) 1st, 2nd, 3rd Dose Only 03/04/2021, 04/01/2021   • Flu Vaccine Intradermal Quad 18-64YR 10/01/2016   • Flu Vaccine Quad PF 6-35MO 10/01/2021   • Hepatitis A 05/23/2018   • Influenza Quad Vaccine (Inpatient) 11/15/2017   • Pneumococcal Conjugate 13-Valent (PCV13) 09/11/2020   • Shingrix 12/01/2021       No Known Allergies     Medications:  Current Outpatient Medications on File Prior to Visit   Medication Sig   • alendronate (FOSAMAX) 70 MG tablet Take 1 tablet by mouth 1 (One) Time Per Week.   • estradiol (ESTRACE VAGINAL) 0.1 MG/GM vaginal cream Insert 2 g into the vagina Daily.   • pantoprazole (PROTONIX) 40 MG EC tablet Take 40 mg by mouth Daily.   • sertraline (ZOLOFT) 50 MG tablet Take 50 mg by mouth Daily.   • [DISCONTINUED] atorvastatin (LIPITOR) 10 MG tablet TAKE ONE TABLET BY MOUTH DAILY   • [DISCONTINUED] SHINGRIX 50 MCG/0.5ML reconstituted suspension      No current facility-administered medications on file prior to visit.       Vital Signs:   /69 (BP Location: Right arm, Patient Position: Sitting)   Pulse 67   Temp 98.2 °F (36.8 °C) (Oral)   Ht 168.9 cm (66.5\")   Wt 74.7 kg (164 lb 9.6 oz)   BMI 26.17 kg/m²       Physical Exam:  Physical Exam  Vitals and nursing note reviewed.   Constitutional:       General: She is not in acute distress.     Appearance: She is not ill-appearing.   HENT:      Right Ear: Tympanic " membrane and ear canal normal.      Left Ear: Tympanic membrane and ear canal normal.      Mouth/Throat:      Mouth: Mucous membranes are moist.      Comments: Pharynx appears normal  Eyes:      Extraocular Movements: Extraocular movements intact.      Pupils: Pupils are equal, round, and reactive to light.   Neck:      Thyroid: No thyromegaly.   Cardiovascular:      Rate and Rhythm: Normal rate and regular rhythm.      Heart sounds: No murmur heard.      Pulmonary:      Effort: Pulmonary effort is normal.      Breath sounds: Normal breath sounds.   Abdominal:      General: There is no distension.      Palpations: Abdomen is soft. There is no mass.      Tenderness: There is no abdominal tenderness.   Musculoskeletal:      Cervical back: Normal range of motion.   Skin:     Findings: No lesion or rash.   Neurological:      General: No focal deficit present.      Mental Status: She is oriented to person, place, and time.      Cranial Nerves: No cranial nerve deficit.   Psychiatric:         Mood and Affect: Mood normal.         Result Review      The following data was reviewed by Emigdio Mcwilliams MD on 02/25/2022.  Lab Results   Component Value Date    WBC 4.89 10/03/2020    HGB 13.3 10/03/2020    HCT 43.5 10/03/2020    MCV 91.2 10/03/2020     10/03/2020     Lab Results   Component Value Date    GLUCOSE 87 10/03/2020    BUN 18 10/03/2020    CREATININE 1.09 (H) 10/03/2020     10/03/2020    K 4.5 10/03/2020     10/03/2020    CO2 25 10/03/2020    CALCIUM 9.4 10/03/2020    PROTEINTOT 7.3 10/03/2020    ALBUMIN 4.3 10/03/2020    ALT 13 10/03/2020    AST 18 10/03/2020    ALKPHOS 70 10/03/2020    BILITOT 0.43 10/03/2020    GLOB 3.0 10/03/2020    BCR 17 10/03/2020    ANIONGAP 16 10/03/2020      Lab Results   Component Value Date    CHLPL 174 10/03/2020    TRIG 58 10/03/2020    HDL 62 (H) 10/03/2020     10/03/2020     Lab Results   Component Value Date    TSH 1.910 10/03/2020     No results found  for: HGBA1C           Assessment and Plan:   Today, we have reviewed Sharon's care.  She is doing well.  We will refill her cholesterol medication as noted below.  Labs are to be updated.  With regard to depression, she has been doing well on the low-dose of sertraline.  I am going to recommend she try to wean off of that over the next several weeks and see how things go.  I recommended she take 1/2 tablet every other day for a few weeks and then consider stopping it or possibly spreading it out further from there.  If she is not able to come off of it, then she will reach back out to us for refills.  We will plan to see her back in about a year.  She is essentially up-to-date on needed health maintenance.       Diagnoses and all orders for this visit:    1. Hypercholesteremia (Primary)  -     atorvastatin (LIPITOR) 10 MG tablet; Take 1 tablet by mouth Every Night.  Dispense: 90 tablet; Refill: 3  -     Comprehensive Metabolic Panel; Future  -     Lipid Panel; Future  -     TSH; Future    2. Depression with anxiety  Comments:  As above.  Thanks.    3. Need for hepatitis C screening test  -     Hepatitis C Antibody; Future          Follow Up   Return in about 1 year (around 2/25/2023) for Recheck, Medicare Wellness.  Patient was given instructions and counseling regarding her condition or for health maintenance advice. Please see specific information pulled into the AVS if appropriate.

## 2022-02-26 LAB
ALBUMIN SERPL-MCNC: 4.4 G/DL (ref 3.5–5.2)
ALBUMIN/GLOB SERPL: 1.5 G/DL
ALP SERPL-CCNC: 57 U/L (ref 39–117)
ALT SERPL W P-5'-P-CCNC: 14 U/L (ref 1–33)
ANION GAP SERPL CALCULATED.3IONS-SCNC: 11.9 MMOL/L (ref 5–15)
AST SERPL-CCNC: 17 U/L (ref 1–32)
BILIRUB SERPL-MCNC: 0.4 MG/DL (ref 0–1.2)
BUN SERPL-MCNC: 14 MG/DL (ref 8–23)
BUN/CREAT SERPL: 13 (ref 7–25)
CALCIUM SPEC-SCNC: 9.6 MG/DL (ref 8.6–10.5)
CHLORIDE SERPL-SCNC: 102 MMOL/L (ref 98–107)
CHOLEST SERPL-MCNC: 156 MG/DL (ref 0–200)
CO2 SERPL-SCNC: 24.1 MMOL/L (ref 22–29)
CREAT SERPL-MCNC: 1.08 MG/DL (ref 0.57–1)
GFR SERPL CREATININE-BSD FRML MDRD: 51 ML/MIN/1.73
GLOBULIN UR ELPH-MCNC: 2.9 GM/DL
GLUCOSE SERPL-MCNC: 88 MG/DL (ref 65–99)
HDLC SERPL-MCNC: 60 MG/DL (ref 40–60)
LDLC SERPL CALC-MCNC: 79 MG/DL (ref 0–100)
LDLC/HDLC SERPL: 1.3 {RATIO}
POTASSIUM SERPL-SCNC: 4.4 MMOL/L (ref 3.5–5.2)
PROT SERPL-MCNC: 7.3 G/DL (ref 6–8.5)
SODIUM SERPL-SCNC: 138 MMOL/L (ref 136–145)
TRIGL SERPL-MCNC: 91 MG/DL (ref 0–150)
TSH SERPL DL<=0.05 MIU/L-ACNC: 1.2 UIU/ML (ref 0.27–4.2)
VLDLC SERPL-MCNC: 17 MG/DL (ref 5–40)

## 2022-07-01 ENCOUNTER — APPOINTMENT (OUTPATIENT)
Dept: WOMENS IMAGING | Facility: HOSPITAL | Age: 67
End: 2022-07-01

## 2022-07-01 PROCEDURE — 77067 SCR MAMMO BI INCL CAD: CPT | Performed by: RADIOLOGY

## 2022-07-01 PROCEDURE — 77063 BREAST TOMOSYNTHESIS BI: CPT | Performed by: RADIOLOGY

## 2023-02-27 ENCOUNTER — LAB (OUTPATIENT)
Dept: LAB | Facility: HOSPITAL | Age: 68
End: 2023-02-27
Payer: MEDICARE

## 2023-02-27 ENCOUNTER — OFFICE VISIT (OUTPATIENT)
Dept: FAMILY MEDICINE CLINIC | Age: 68
End: 2023-02-27
Payer: MEDICARE

## 2023-02-27 VITALS
DIASTOLIC BLOOD PRESSURE: 80 MMHG | HEIGHT: 66 IN | BODY MASS INDEX: 25.55 KG/M2 | SYSTOLIC BLOOD PRESSURE: 130 MMHG | WEIGHT: 159 LBS | TEMPERATURE: 98.6 F | HEART RATE: 73 BPM

## 2023-02-27 DIAGNOSIS — R79.89 ELEVATED SERUM CREATININE: ICD-10-CM

## 2023-02-27 DIAGNOSIS — Z00.00 PHYSICAL EXAM: Primary | ICD-10-CM

## 2023-02-27 DIAGNOSIS — E78.00 HYPERCHOLESTEREMIA: ICD-10-CM

## 2023-02-27 DIAGNOSIS — Z79.899 OTHER LONG TERM (CURRENT) DRUG THERAPY: ICD-10-CM

## 2023-02-27 DIAGNOSIS — R82.81 PYURIA: ICD-10-CM

## 2023-02-27 DIAGNOSIS — D64.9 ANEMIA, UNSPECIFIED TYPE: ICD-10-CM

## 2023-02-27 LAB
ALBUMIN SERPL-MCNC: 4.7 G/DL (ref 3.5–5.2)
ALBUMIN/GLOB SERPL: 2.4 G/DL
ALP SERPL-CCNC: 52 U/L (ref 39–117)
ALT SERPL W P-5'-P-CCNC: 10 U/L (ref 1–33)
ANION GAP SERPL CALCULATED.3IONS-SCNC: 12.1 MMOL/L (ref 5–15)
AST SERPL-CCNC: 14 U/L (ref 1–32)
BACTERIA UR QL AUTO: ABNORMAL /HPF
BILIRUB SERPL-MCNC: 0.3 MG/DL (ref 0–1.2)
BILIRUB UR QL STRIP: NEGATIVE
BUN SERPL-MCNC: 14 MG/DL (ref 8–23)
BUN/CREAT SERPL: 14.3 (ref 7–25)
CALCIUM SPEC-SCNC: 9.4 MG/DL (ref 8.6–10.5)
CHLORIDE SERPL-SCNC: 105 MMOL/L (ref 98–107)
CHOLEST SERPL-MCNC: 153 MG/DL (ref 0–200)
CLARITY UR: ABNORMAL
CO2 SERPL-SCNC: 23.9 MMOL/L (ref 22–29)
COLOR UR: YELLOW
CREAT SERPL-MCNC: 0.98 MG/DL (ref 0.57–1)
DEPRECATED RDW RBC AUTO: 40.8 FL (ref 37–54)
EGFRCR SERPLBLD CKD-EPI 2021: 63 ML/MIN/1.73
ERYTHROCYTE [DISTWIDTH] IN BLOOD BY AUTOMATED COUNT: 13.2 % (ref 12.3–15.4)
GLOBULIN UR ELPH-MCNC: 2 GM/DL
GLUCOSE SERPL-MCNC: 92 MG/DL (ref 65–99)
GLUCOSE UR STRIP-MCNC: NEGATIVE MG/DL
HCT VFR BLD AUTO: 36.4 % (ref 34–46.6)
HDLC SERPL-MCNC: 55 MG/DL (ref 40–60)
HGB BLD-MCNC: 11.4 G/DL (ref 12–15.9)
HGB UR QL STRIP.AUTO: ABNORMAL
KETONES UR QL STRIP: NEGATIVE
LDLC SERPL CALC-MCNC: 80 MG/DL (ref 0–100)
LDLC/HDLC SERPL: 1.43 {RATIO}
LEUKOCYTE ESTERASE UR QL STRIP.AUTO: ABNORMAL
MCH RBC QN AUTO: 26.3 PG (ref 26.6–33)
MCHC RBC AUTO-ENTMCNC: 31.3 G/DL (ref 31.5–35.7)
MCV RBC AUTO: 84.1 FL (ref 79–97)
NITRITE UR QL STRIP: NEGATIVE
PH UR STRIP.AUTO: 6.5 [PH] (ref 5–8)
PLATELET # BLD AUTO: 270 10*3/MM3 (ref 140–450)
PMV BLD AUTO: 9.8 FL (ref 6–12)
POTASSIUM SERPL-SCNC: 4.4 MMOL/L (ref 3.5–5.2)
PROT SERPL-MCNC: 6.7 G/DL (ref 6–8.5)
PROT UR QL STRIP: NEGATIVE
RBC # BLD AUTO: 4.33 10*6/MM3 (ref 3.77–5.28)
RBC # UR STRIP: ABNORMAL /HPF
REF LAB TEST METHOD: ABNORMAL
SODIUM SERPL-SCNC: 141 MMOL/L (ref 136–145)
SP GR UR STRIP: 1.01 (ref 1–1.03)
SQUAMOUS #/AREA URNS HPF: ABNORMAL /HPF
TRIGL SERPL-MCNC: 98 MG/DL (ref 0–150)
TSH SERPL DL<=0.05 MIU/L-ACNC: 1.26 UIU/ML (ref 0.27–4.2)
UROBILINOGEN UR QL STRIP: ABNORMAL
VLDLC SERPL-MCNC: 18 MG/DL (ref 5–40)
WBC # UR STRIP: ABNORMAL /HPF
WBC NRBC COR # BLD: 5 10*3/MM3 (ref 3.4–10.8)

## 2023-02-27 PROCEDURE — 1170F FXNL STATUS ASSESSED: CPT | Performed by: FAMILY MEDICINE

## 2023-02-27 PROCEDURE — 80053 COMPREHEN METABOLIC PANEL: CPT

## 2023-02-27 PROCEDURE — 99213 OFFICE O/P EST LOW 20 MIN: CPT | Performed by: FAMILY MEDICINE

## 2023-02-27 PROCEDURE — 80061 LIPID PANEL: CPT

## 2023-02-27 PROCEDURE — 84443 ASSAY THYROID STIM HORMONE: CPT

## 2023-02-27 PROCEDURE — 36415 COLL VENOUS BLD VENIPUNCTURE: CPT

## 2023-02-27 PROCEDURE — 83540 ASSAY OF IRON: CPT

## 2023-02-27 PROCEDURE — 1159F MED LIST DOCD IN RCRD: CPT | Performed by: FAMILY MEDICINE

## 2023-02-27 PROCEDURE — 82728 ASSAY OF FERRITIN: CPT

## 2023-02-27 PROCEDURE — G0439 PPPS, SUBSEQ VISIT: HCPCS | Performed by: FAMILY MEDICINE

## 2023-02-27 PROCEDURE — 81001 URINALYSIS AUTO W/SCOPE: CPT

## 2023-02-27 PROCEDURE — 87186 SC STD MICRODIL/AGAR DIL: CPT | Performed by: FAMILY MEDICINE

## 2023-02-27 PROCEDURE — 84466 ASSAY OF TRANSFERRIN: CPT

## 2023-02-27 PROCEDURE — 87088 URINE BACTERIA CULTURE: CPT | Performed by: FAMILY MEDICINE

## 2023-02-27 PROCEDURE — 87086 URINE CULTURE/COLONY COUNT: CPT | Performed by: FAMILY MEDICINE

## 2023-02-27 PROCEDURE — 85027 COMPLETE CBC AUTOMATED: CPT

## 2023-02-27 RX ORDER — CHOLECALCIFEROL (VITAMIN D3) 125 MCG
500 CAPSULE ORAL DAILY
COMMUNITY

## 2023-02-27 RX ORDER — ATORVASTATIN CALCIUM 10 MG/1
10 TABLET, FILM COATED ORAL NIGHTLY
Qty: 90 TABLET | Refills: 3 | Status: SHIPPED | OUTPATIENT
Start: 2023-02-27

## 2023-02-27 RX ORDER — FAMOTIDINE 20 MG/1
20 TABLET, FILM COATED ORAL 2 TIMES DAILY
COMMUNITY

## 2023-02-27 NOTE — PROGRESS NOTES
The ABCs of the Annual Wellness Visit  Subsequent Medicare Wellness Visit    Subjective    Sharon Tolliver is a 68 y.o. female who presents for a Subsequent Medicare Wellness Visit.    The following portions of the patient's history were reviewed and updated as appropriate: allergies, current medications, past family history, past medical history, past social history, past surgical history and problem list.    Compared to one year ago, the patient feels her physical health is the same.    Compared to one year ago, the patient feels her mental health is worse.  There are some family issues going on that are challenging for her.    Recent Hospitalizations:  She was not admitted to the hospital during the last year.       Current Medical Providers:  Patient Care Team:  Emigdio Mcwilliams MD as PCP - General (Family Medicine)  Iliana Malone MD as Consulting Physician (Obstetrics and Gynecology)    Outpatient Medications Prior to Visit   Medication Sig Dispense Refill   • alendronate (FOSAMAX) 70 MG tablet Take 1 tablet by mouth 1 (One) Time Per Week.     • famotidine (PEPCID) 20 MG tablet Take 20 mg by mouth 2 (Two) Times a Day.     • vitamin B-12 (CYANOCOBALAMIN) 500 MCG tablet Take 500 mcg by mouth Daily.     • atorvastatin (LIPITOR) 10 MG tablet Take 1 tablet by mouth Every Night. 90 tablet 3   • estradiol (ESTRACE VAGINAL) 0.1 MG/GM vaginal cream Insert 2 g into the vagina Daily. 42.5 g 1   • pantoprazole (PROTONIX) 40 MG EC tablet Take 40 mg by mouth Daily.     • sertraline (ZOLOFT) 50 MG tablet Take 50 mg by mouth Daily.       No facility-administered medications prior to visit.       No opioid medication identified on active medication list. I have reviewed chart for other potential  high risk medication/s and harmful drug interactions in the elderly.          Aspirin is not on active medication list.  Aspirin use is not indicated based on review of current medical condition/s. Risk of harm outweighs  "potential benefits.  .    Patient Active Problem List   Diagnosis   • Gastroesophageal reflux disease   • Osteopenia of multiple sites   • Dyspareunia due to medical condition in female   • Hypercholesteremia     Advance Care Planning  Advance Directive is not on file.  ACP discussion was held with the patient during this visit. Patient does not have an advance directive, information provided.  She thinks her sister would make decisions if needed.     Objective    Vitals:    02/27/23 1047   BP: 130/80   BP Location: Right arm   Patient Position: Sitting   Pulse: 73   Temp: 98.6 °F (37 °C)   TempSrc: Oral   Weight: 72.1 kg (159 lb)   Height: 168.9 cm (66.5\")     Estimated body mass index is 25.28 kg/m² as calculated from the following:    Height as of this encounter: 168.9 cm (66.5\").    Weight as of this encounter: 72.1 kg (159 lb).    BMI is >= 25 and <30. (Overweight) The following options were offered after discussion;: exercise counseling/recommendations and nutrition counseling/recommendations    Does the patient have evidence of cognitive impairment? No          HEALTH RISK ASSESSMENT    Smoking Status:  Social History     Tobacco Use   Smoking Status Never   Smokeless Tobacco Never     Alcohol Consumption:  Social History     Substance and Sexual Activity   Alcohol Use No     Fall Risk Screen:    CHERELLEADI Fall Risk Assessment was completed, and patient is at LOW risk for falls.Assessment completed on:2/27/2023    Depression Screening:  PHQ-2/PHQ-9 Depression Screening 2/27/2023   Little Interest or Pleasure in Doing Things 0-->not at all   Feeling Down, Depressed or Hopeless 0-->not at all   PHQ-9: Brief Depression Severity Measure Score 0       Health Habits and Functional and Cognitive Screening:  Functional & Cognitive Status 2/27/2023   Do you have difficulty preparing food and eating? No   Do you have difficulty bathing yourself, getting dressed or grooming yourself? No   Do you have difficulty using the " toilet? No   Do you have difficulty moving around from place to place? No   Do you have trouble with steps or getting out of a bed or a chair? No   Current Diet Well Balanced Diet   Dental Exam Up to date   Eye Exam Not up to date   Exercise (times per week) 3 times per week   Current Exercises Include Walking   Do you need help using the phone?  No   Are you deaf or do you have serious difficulty hearing?  No   Do you need help with transportation? No   Do you need help shopping? No   Do you need help preparing meals?  No   Do you need help with housework?  No   Do you need help with laundry? No   Do you need help taking your medications? No   Do you need help managing money? No   Do you ever drive or ride in a car without wearing a seat belt? No   Have you felt unusual stress, anger or loneliness in the last month? No   Who do you live with? Child   If you need help, do you have trouble finding someone available to you? No   Have you been bothered in the last four weeks by sexual problems? No   Do you have difficulty concentrating, remembering or making decisions? No       Age-appropriate Screening Schedule:  Refer to the list below for future screening recommendations based on patient's age, sex and/or medical conditions. Orders for these recommended tests are listed in the plan section. The patient has been provided with a written plan.    Health Maintenance   Topic Date Due   • DXA SCAN  Never done   • TDAP/TD VACCINES (1 - Tdap) Never done   • PAP SMEAR  04/11/2021   • LIPID PANEL  02/25/2023   • MAMMOGRAM  05/14/2023   • ANNUAL WELLNESS VISIT  02/27/2024   • COLORECTAL CANCER SCREENING  06/16/2024   • HEPATITIS C SCREENING  Completed   • COVID-19 Vaccine  Completed   • INFLUENZA VACCINE  Completed   • Pneumococcal Vaccine 65+  Completed   • ZOSTER VACCINE  Discontinued                CMS Preventative Services Quick Reference  Risk Factors Identified During Encounter  Immunizations Discussed/Encouraged:  "Tdap  The above risks/problems have been discussed with the patient.  Pertinent information has been shared with the patient in the After Visit Summary.  An After Visit Summary and PPPS were made available to the patient.    Follow Up:   Next Medicare Wellness visit to be scheduled in 1 year.       Additional E&M Note during same encounter follows:  Patient has multiple medical problems which are significant and separately identifiable that require additional work above and beyond the Medicare Wellness Visit.      Chief Complaint:  Elevated cholesterol    Subjective        In addition to the Medicare wellness exam, Sharon is here for follow-up on her cholesterol.  She remains on statin therapy for this.  She tolerates atorvastatin well, and she denies any acute issue.  She is due for recheck on her lipids at this time.    She also has mild elevation of the creatinine that we have been monitoring.    Review of Systems:  Review of Systems   Constitutional: Negative for chills and fever.   Respiratory: Negative for cough and shortness of breath.    Cardiovascular: Negative for chest pain and palpitations.   Gastrointestinal: Negative for abdominal pain, nausea and vomiting.        Objective   Vital Signs:  /80 (BP Location: Right arm, Patient Position: Sitting)   Pulse 73   Temp 98.6 °F (37 °C) (Oral)   Ht 168.9 cm (66.5\")   Wt 72.1 kg (159 lb)   BMI 25.28 kg/m²     Physical Exam  Vitals and nursing note reviewed.   Constitutional:       General: She is not in acute distress.     Appearance: She is not ill-appearing.   HENT:      Right Ear: Tympanic membrane and ear canal normal.      Left Ear: Tympanic membrane and ear canal normal.      Ears:      Comments: Hearing is normal with forced whisper bilaterally.     Mouth/Throat:      Mouth: Mucous membranes are moist.      Comments: Pharynx appears normal  Eyes:      Extraocular Movements: Extraocular movements intact.      Pupils: Pupils are equal, round, " and reactive to light.      Comments: Binocular vision is 20/25 with correction.   Neck:      Thyroid: No thyromegaly.   Cardiovascular:      Rate and Rhythm: Normal rate and regular rhythm.      Heart sounds: No murmur heard.  Pulmonary:      Effort: Pulmonary effort is normal.      Breath sounds: Normal breath sounds.   Abdominal:      General: There is no distension.      Palpations: Abdomen is soft. There is no mass.      Tenderness: There is no abdominal tenderness.   Musculoskeletal:      Cervical back: Normal range of motion.   Skin:     Findings: No lesion or rash.   Neurological:      General: No focal deficit present.      Mental Status: She is oriented to person, place, and time.      Cranial Nerves: No cranial nerve deficit.   Psychiatric:         Mood and Affect: Mood normal.       The following data was reviewed by Emigdio Mcwilliams MD on 02/27/2023.  Lab Results   Component Value Date    WBC 4.89 10/03/2020    HGB 13.3 10/03/2020    HCT 43.5 10/03/2020    MCV 91.2 10/03/2020     10/03/2020     Lab Results   Component Value Date    GLUCOSE 88 02/25/2022    BUN 14 02/25/2022    CREATININE 1.08 (H) 02/25/2022     02/25/2022    K 4.4 02/25/2022     02/25/2022    CO2 24.1 02/25/2022    CALCIUM 9.6 02/25/2022    PROTEINTOT 7.3 02/25/2022    ALBUMIN 4.40 02/25/2022    ALT 14 02/25/2022    AST 17 02/25/2022    ALKPHOS 57 02/25/2022    BILITOT 0.4 02/25/2022    GLOB 2.9 02/25/2022    AGRATIO 1.5 02/25/2022    BCR 13.0 02/25/2022    ANIONGAP 11.9 02/25/2022      Lab Results   Component Value Date    CHOL 156 02/25/2022    CHLPL 174 10/03/2020    TRIG 91 02/25/2022    HDL 60 02/25/2022    LDL 79 02/25/2022     Lab Results   Component Value Date    TSH 1.200 02/25/2022               Assessment and Plan:       Sharon seems to be doing well overall.  Regarding her annual wellness, she is mostly up-to-date on needed screenings.  She has her gynecologic care done with her gynecologist.   Regarding vaccines, she is also mostly up-to-date on those.  She might want to consider a tetanus booster were she to have a cut or scrape, particularly with yahaira metal.    In addition, we reviewed her usual care.  We will send refills on atorvastatin as noted below and update some blood work.  We will tentatively plan to see her back in about a year for recheck.    Diagnoses and all orders for this visit:    1. Physical exam (Primary)    2. Hypercholesteremia  -     atorvastatin (LIPITOR) 10 MG tablet; Take 1 tablet by mouth Every Night.  Dispense: 90 tablet; Refill: 3  -     Comprehensive Metabolic Panel; Future  -     Lipid Panel; Future  -     TSH; Future    3. Elevated serum creatinine  -     Comprehensive Metabolic Panel; Future  -     Urinalysis With Microscopic - Urine, Clean Catch; Future    4. Other long term (current) drug therapy  -     CBC (No Diff); Future         Follow Up   Return in about 1 year (around 2/27/2024) for Recheck, Medicare Wellness.  Patient was given instructions and counseling regarding her condition or for health maintenance advice. Please see specific information pulled into the AVS if appropriate.

## 2023-02-28 DIAGNOSIS — D64.9 ANEMIA, UNSPECIFIED TYPE: Primary | ICD-10-CM

## 2023-02-28 LAB
FERRITIN SERPL-MCNC: 16.6 NG/ML (ref 13–150)
IRON 24H UR-MRATE: 27 MCG/DL (ref 37–145)
IRON SATN MFR SERPL: 6 % (ref 20–50)
TIBC SERPL-MCNC: 434 MCG/DL (ref 298–536)
TRANSFERRIN SERPL-MCNC: 291 MG/DL (ref 200–360)

## 2023-03-01 DIAGNOSIS — E78.00 HYPERCHOLESTEREMIA: ICD-10-CM

## 2023-03-01 LAB — BACTERIA SPEC AEROBE CULT: ABNORMAL

## 2023-03-01 RX ORDER — ATORVASTATIN CALCIUM 10 MG/1
TABLET, FILM COATED ORAL
Qty: 90 TABLET | Refills: 3 | OUTPATIENT
Start: 2023-03-01

## 2023-03-29 ENCOUNTER — TELEPHONE (OUTPATIENT)
Dept: FAMILY MEDICINE CLINIC | Age: 68
End: 2023-03-29
Payer: MEDICARE

## 2023-03-29 DIAGNOSIS — R82.81 PYURIA: Primary | ICD-10-CM

## 2023-03-29 NOTE — TELEPHONE ENCOUNTER
----- Message from Germaine Garcia LPN sent at 3/1/2023  2:42 PM EST -----  TICKLE for repeat UA with micro in 4 weeks

## 2023-03-31 ENCOUNTER — LAB (OUTPATIENT)
Dept: LAB | Facility: HOSPITAL | Age: 68
End: 2023-03-31
Payer: MEDICARE

## 2023-03-31 DIAGNOSIS — R82.998 WHITE BLOOD CELLS PRESENT ON URINE MICROSCOPY: ICD-10-CM

## 2023-03-31 DIAGNOSIS — R82.998 WHITE BLOOD CELLS PRESENT ON URINE MICROSCOPY: Primary | ICD-10-CM

## 2023-03-31 DIAGNOSIS — R82.81 PYURIA: ICD-10-CM

## 2023-03-31 LAB
BACTERIA UR QL AUTO: ABNORMAL /HPF
BILIRUB UR QL STRIP: NEGATIVE
CLARITY UR: ABNORMAL
COLOR UR: YELLOW
GLUCOSE UR STRIP-MCNC: NEGATIVE MG/DL
HGB UR QL STRIP.AUTO: NEGATIVE
KETONES UR QL STRIP: NEGATIVE
LEUKOCYTE ESTERASE UR QL STRIP.AUTO: ABNORMAL
NITRITE UR QL STRIP: POSITIVE
PH UR STRIP.AUTO: 7 [PH] (ref 5–8)
PROT UR QL STRIP: NEGATIVE
RBC # UR STRIP: ABNORMAL /HPF
REF LAB TEST METHOD: ABNORMAL
SP GR UR STRIP: 1.01 (ref 1–1.03)
SQUAMOUS #/AREA URNS HPF: ABNORMAL /HPF
TRANS CELLS #/AREA URNS HPF: ABNORMAL /HPF
UROBILINOGEN UR QL STRIP: ABNORMAL
WBC # UR STRIP: ABNORMAL /HPF

## 2023-03-31 PROCEDURE — 87186 SC STD MICRODIL/AGAR DIL: CPT

## 2023-03-31 PROCEDURE — 87088 URINE BACTERIA CULTURE: CPT

## 2023-03-31 PROCEDURE — 81001 URINALYSIS AUTO W/SCOPE: CPT

## 2023-03-31 PROCEDURE — 87086 URINE CULTURE/COLONY COUNT: CPT

## 2023-04-02 LAB — BACTERIA SPEC AEROBE CULT: ABNORMAL

## 2023-04-03 ENCOUNTER — TELEPHONE (OUTPATIENT)
Dept: UROLOGY | Facility: CLINIC | Age: 68
End: 2023-04-03
Payer: MEDICARE

## 2023-04-03 ENCOUNTER — TELEPHONE (OUTPATIENT)
Dept: FAMILY MEDICINE CLINIC | Age: 68
End: 2023-04-03
Payer: MEDICARE

## 2023-04-03 DIAGNOSIS — R82.81 PYURIA: Primary | ICD-10-CM

## 2023-04-03 DIAGNOSIS — R82.71 BACTERIURIA: ICD-10-CM

## 2023-04-03 DIAGNOSIS — R82.71 BACTERIA IN URINE: Primary | ICD-10-CM

## 2023-04-03 RX ORDER — NITROFURANTOIN 25; 75 MG/1; MG/1
100 CAPSULE ORAL 2 TIMES DAILY
Qty: 14 CAPSULE | Refills: 0 | Status: SHIPPED | OUTPATIENT
Start: 2023-04-03 | End: 2023-04-10

## 2023-04-03 NOTE — TELEPHONE ENCOUNTER
LMOM for pt to return my call.  Anyone can give her the message.  Iliana has called in Macrobid to [facility] [place] for her UTI.  HUB or appt clerks will call pt with appt, if they have not already.  She is to keep that appt with Iliana.  Take all medication till gone.

## 2023-04-03 NOTE — TELEPHONE ENCOUNTER
----- Message from Emigdio Mcwilliams MD sent at 4/3/2023  6:54 AM EDT -----  Please let Sharon know that her urine continues to show a significant amount of white blood cells and bacteria.  I am not necessarily recommending treatment for urinary tract infection, but if she is having frequency of urination, dysuria or pain with urination, or new lower abdominal pressure or back pain, let me know.  In that case, I would lean toward antibiotic.  Because we have seen a persistent finding with white blood cells and bacteria in the urine, I would recommend referral to urology for evaluation.  If she is agreeable, please place this order.  Let me know if she has other concerns, and forward her a copy of this testing if she would like.  Thanks.

## 2023-05-15 ENCOUNTER — OFFICE VISIT (OUTPATIENT)
Dept: UROLOGY | Facility: CLINIC | Age: 68
End: 2023-05-15
Payer: MEDICARE

## 2023-05-15 VITALS — HEIGHT: 67 IN | RESPIRATION RATE: 14 BRPM | BODY MASS INDEX: 25.39 KG/M2 | WEIGHT: 161.8 LBS

## 2023-05-15 DIAGNOSIS — R82.71 BACTERIA IN URINE: Primary | ICD-10-CM

## 2023-05-15 PROBLEM — F32.A DEPRESSIVE DISORDER: Status: ACTIVE | Noted: 2020-08-03

## 2023-05-15 LAB
BILIRUB BLD-MCNC: NEGATIVE MG/DL
CLARITY, POC: CLEAR
COLOR UR: YELLOW
EXPIRATION DATE: ABNORMAL
GLUCOSE UR STRIP-MCNC: NEGATIVE MG/DL
KETONES UR QL: NEGATIVE
LEUKOCYTE EST, POC: ABNORMAL
Lab: ABNORMAL
NITRITE UR-MCNC: NEGATIVE MG/ML
PH UR: 6.5 [PH] (ref 5–8)
PROT UR STRIP-MCNC: NEGATIVE MG/DL
RBC # UR STRIP: ABNORMAL /UL
SP GR UR: 1.02 (ref 1–1.03)
UROBILINOGEN UR QL: NORMAL

## 2023-05-15 PROCEDURE — 88108 CYTOPATH CONCENTRATE TECH: CPT | Performed by: NURSE PRACTITIONER

## 2023-05-15 PROCEDURE — 99203 OFFICE O/P NEW LOW 30 MIN: CPT | Performed by: NURSE PRACTITIONER

## 2023-05-15 PROCEDURE — 1160F RVW MEDS BY RX/DR IN RCRD: CPT | Performed by: NURSE PRACTITIONER

## 2023-05-15 PROCEDURE — 81003 URINALYSIS AUTO W/O SCOPE: CPT | Performed by: NURSE PRACTITIONER

## 2023-05-15 PROCEDURE — 81015 MICROSCOPIC EXAM OF URINE: CPT | Performed by: NURSE PRACTITIONER

## 2023-05-15 PROCEDURE — 1159F MED LIST DOCD IN RCRD: CPT | Performed by: NURSE PRACTITIONER

## 2023-05-15 NOTE — PROGRESS NOTES
Chief Complaint: bactiuria    Subjective         History of Present Illness  Sharon Tolliver is a 68 y.o. female presents to [facility] to be seen for pyuria and bacteriuria.    Patient is with history of pyuria and bacteriuria.    Patient was seen in 2023 for annual wellness visit and had a urine culture performed as her urinalysis was positive for bacteria and white blood cells.    Does not appear that the patient was with any urinary symptoms.  She was not treated for this urinary tract infection and had a repeat urinalysis performed a month later which was still positive for E. coli greater than 100,000 colony-forming units per milliliter resistant to ampicillin, levofloxacin, Bactrim.      She was treated with an antibiotic even though she was asymptomatic.     She states that she is still with no urinary symptoms.     She states no issues with vaginal itching or burning or pain with intercourse.     She states no family hx of gu malignancies.     She is an non smoker.     He does state that she often does not drink enough fluids and will delay the urge to void    Objective     Past Medical History:   Diagnosis Date   • Chronic tension headaches    • Depression with anxiety    • Diverticulosis    • Elevated cholesterol    • Postmenopausal osteoporosis    • Reflux esophagitis        Past Surgical History:   Procedure Laterality Date   • BREAST BIOPSY     •  SECTION      x2   • COLONOSCOPY  2014    Normal   • ESOPHAGOGASTRECTOMY     • UPPER GASTROINTESTINAL ENDOSCOPY           Current Outpatient Medications:   •  alendronate (FOSAMAX) 70 MG tablet, Take 1 tablet by mouth 1 (One) Time Per Week., Disp: , Rfl:   •  atorvastatin (LIPITOR) 10 MG tablet, Take 1 tablet by mouth Every Night., Disp: 90 tablet, Rfl: 3  •  famotidine (PEPCID) 20 MG tablet, Take 1 tablet by mouth 2 (Two) Times a Day., Disp: , Rfl:   •  vitamin B-12 (CYANOCOBALAMIN) 500 MCG tablet, Take 1 tablet  "by mouth Daily., Disp: , Rfl:     No Known Allergies     Family History   Problem Relation Age of Onset   • Arthritis Mother    • Alzheimer's disease Father    • Stroke Father    • BRCA 1/2 Neg Hx    • Breast cancer Neg Hx    • Colon cancer Neg Hx    • Endometrial cancer Neg Hx    • Ovarian cancer Neg Hx        Social History     Socioeconomic History   • Marital status:    • Number of children: 2   Tobacco Use   • Smoking status: Never   • Smokeless tobacco: Never   Vaping Use   • Vaping Use: Never used   Substance and Sexual Activity   • Alcohol use: No   • Drug use: No       Vital Signs:   Resp 14   Ht 168.9 cm (66.5\")   Wt 73.4 kg (161 lb 12.8 oz)   BMI 25.72 kg/m²      Physical Exam     Result Review :   The following data was reviewed by: TESSA Sunshine on 05/15/2023:  Results for orders placed or performed in visit on 05/15/23   POC Urinalysis Dipstick, Automated    Specimen: Urine   Result Value Ref Range    Color Yellow Yellow, Straw, Dark Yellow, Xochilt    Clarity, UA Clear Clear    Specific Gravity  1.020 1.005 - 1.030    pH, Urine 6.5 5.0 - 8.0    Leukocytes Small (1+) (A) Negative    Nitrite, UA Negative Negative    Protein, POC Negative Negative mg/dL    Glucose, UA Negative Negative mg/dL    Ketones, UA Negative Negative    Urobilinogen, UA Normal Normal, 0.2 E.U./dL    Bilirubin Negative Negative    Blood, UA Trace (A) Negative    Lot Number [id number]     Expiration Date 4/2,024             Procedures        Assessment and Plan    Diagnoses and all orders for this visit:    1. Bacteria in urine (Primary)  -     POC Urinalysis Dipstick, Automated  -     Urinalysis, Microscopic Only - Urine, Clean Catch; Future  -     Cytology, Urine; Future      no evidence on urine dip of infection today.     Encouraged behavioral modifications including fluid management, hydration, not delaying urgency when she needs to void.    Discussed that routine screening UA for infection is not recommended as " in postmenopausal women asymptomatic bacteruria is common and does not warrant treatment     Discussed that obtaining urine culture after treatment for UTI is not recommended unless a patient has persistent symptoms of UTI given prevalence of asymptomatic UTI.      Discussed with patient as she reports she does not drink enough water and does not void frequently enough that this could also be the cause of some of her pyuria.    Is a very low risk patient at this point in time and I would not recommend proceeding with work-up unless she is with an abnormal urine cytology.                        I spent 20 minutes caring for Sharon on this date of service. This time includes time spent by me in the following activities:reviewing tests, obtaining and/or reviewing a separately obtained history, performing a medically appropriate examination and/or evaluation , counseling and educating the patient/family/caregiver, ordering medications, tests, or procedures, and documenting information in the medical record  Follow Up   Return if symptoms worsen or fail to improve.  Patient was given instructions and counseling regarding her condition or for health maintenance advice. Please see specific information pulled into the AVS if appropriate.         This document has been electronically signed by TESSA Sunshine  May 15, 2023 11:17 EDT

## 2023-05-16 LAB
BACTERIA UR QL AUTO: ABNORMAL /HPF
HYALINE CASTS UR QL AUTO: ABNORMAL /LPF
MUCOUS THREADS URNS QL MICRO: ABNORMAL /HPF
RBC # UR STRIP: ABNORMAL /HPF
REF LAB TEST METHOD: ABNORMAL
SQUAMOUS #/AREA URNS HPF: ABNORMAL /HPF
WBC # UR STRIP: ABNORMAL /HPF

## 2023-05-17 LAB
CYTO UR: NORMAL
LAB AP CASE REPORT: NORMAL
LAB AP CLINICAL INFORMATION: NORMAL
LAB AP NON-GYN INTERPRETATION: NORMAL
PATH REPORT.FINAL DX SPEC: NORMAL
PATH REPORT.GROSS SPEC: NORMAL

## 2023-08-17 ENCOUNTER — OFFICE VISIT (OUTPATIENT)
Dept: FAMILY MEDICINE CLINIC | Age: 68
End: 2023-08-17
Payer: MEDICARE

## 2023-08-17 ENCOUNTER — LAB (OUTPATIENT)
Dept: LAB | Facility: HOSPITAL | Age: 68
End: 2023-08-17
Payer: MEDICARE

## 2023-08-17 VITALS
BODY MASS INDEX: 25.55 KG/M2 | HEART RATE: 86 BPM | TEMPERATURE: 98.2 F | DIASTOLIC BLOOD PRESSURE: 82 MMHG | WEIGHT: 159 LBS | HEIGHT: 66 IN | SYSTOLIC BLOOD PRESSURE: 132 MMHG

## 2023-08-17 DIAGNOSIS — R55 SYNCOPE, UNSPECIFIED SYNCOPE TYPE: ICD-10-CM

## 2023-08-17 DIAGNOSIS — D64.9 ANEMIA, UNSPECIFIED TYPE: ICD-10-CM

## 2023-08-17 DIAGNOSIS — R55 SYNCOPE, UNSPECIFIED SYNCOPE TYPE: Primary | ICD-10-CM

## 2023-08-17 DIAGNOSIS — R53.83 FATIGUE, UNSPECIFIED TYPE: ICD-10-CM

## 2023-08-17 DIAGNOSIS — R06.09 DYSPNEA ON EXERTION: ICD-10-CM

## 2023-08-17 LAB
ALBUMIN SERPL-MCNC: 4.6 G/DL (ref 3.5–5.2)
ALBUMIN/GLOB SERPL: 1.8 G/DL
ALP SERPL-CCNC: 59 U/L (ref 39–117)
ALT SERPL W P-5'-P-CCNC: 16 U/L (ref 1–33)
ANION GAP SERPL CALCULATED.3IONS-SCNC: 10 MMOL/L (ref 5–15)
AST SERPL-CCNC: 20 U/L (ref 1–32)
BILIRUB SERPL-MCNC: 0.3 MG/DL (ref 0–1.2)
BUN SERPL-MCNC: 15 MG/DL (ref 8–23)
BUN/CREAT SERPL: 14.4 (ref 7–25)
CALCIUM SPEC-SCNC: 9.7 MG/DL (ref 8.6–10.5)
CHLORIDE SERPL-SCNC: 105 MMOL/L (ref 98–107)
CO2 SERPL-SCNC: 27 MMOL/L (ref 22–29)
CREAT SERPL-MCNC: 1.04 MG/DL (ref 0.57–1)
DEPRECATED RDW RBC AUTO: 46.1 FL (ref 37–54)
EGFRCR SERPLBLD CKD-EPI 2021: 58.7 ML/MIN/1.73
ERYTHROCYTE [DISTWIDTH] IN BLOOD BY AUTOMATED COUNT: 15.1 % (ref 12.3–15.4)
FERRITIN SERPL-MCNC: 16.2 NG/ML (ref 13–150)
GLOBULIN UR ELPH-MCNC: 2.5 GM/DL
GLUCOSE SERPL-MCNC: 93 MG/DL (ref 65–99)
HCT VFR BLD AUTO: 30.5 % (ref 34–46.6)
HGB BLD-MCNC: 9.4 G/DL (ref 12–15.9)
IRON 24H UR-MRATE: 63 MCG/DL (ref 37–145)
IRON SATN MFR SERPL: 16 % (ref 20–50)
MAGNESIUM SERPL-MCNC: 1.9 MG/DL (ref 1.6–2.4)
MCH RBC QN AUTO: 25.8 PG (ref 26.6–33)
MCHC RBC AUTO-ENTMCNC: 30.8 G/DL (ref 31.5–35.7)
MCV RBC AUTO: 83.8 FL (ref 79–97)
PLATELET # BLD AUTO: 228 10*3/MM3 (ref 140–450)
PMV BLD AUTO: 9.9 FL (ref 6–12)
POTASSIUM SERPL-SCNC: 4.1 MMOL/L (ref 3.5–5.2)
PROT SERPL-MCNC: 7.1 G/DL (ref 6–8.5)
RBC # BLD AUTO: 3.64 10*6/MM3 (ref 3.77–5.28)
SODIUM SERPL-SCNC: 142 MMOL/L (ref 136–145)
TIBC SERPL-MCNC: 405 MCG/DL (ref 298–536)
TRANSFERRIN SERPL-MCNC: 272 MG/DL (ref 200–360)
TSH SERPL DL<=0.05 MIU/L-ACNC: 2.41 UIU/ML (ref 0.27–4.2)
WBC NRBC COR # BLD: 4.3 10*3/MM3 (ref 3.4–10.8)

## 2023-08-17 PROCEDURE — 83540 ASSAY OF IRON: CPT | Performed by: FAMILY MEDICINE

## 2023-08-17 PROCEDURE — 36415 COLL VENOUS BLD VENIPUNCTURE: CPT

## 2023-08-17 PROCEDURE — 82728 ASSAY OF FERRITIN: CPT | Performed by: FAMILY MEDICINE

## 2023-08-17 PROCEDURE — 84443 ASSAY THYROID STIM HORMONE: CPT

## 2023-08-17 PROCEDURE — 99214 OFFICE O/P EST MOD 30 MIN: CPT | Performed by: FAMILY MEDICINE

## 2023-08-17 PROCEDURE — 84466 ASSAY OF TRANSFERRIN: CPT | Performed by: FAMILY MEDICINE

## 2023-08-17 PROCEDURE — 80053 COMPREHEN METABOLIC PANEL: CPT

## 2023-08-17 PROCEDURE — 93000 ELECTROCARDIOGRAM COMPLETE: CPT | Performed by: FAMILY MEDICINE

## 2023-08-17 PROCEDURE — 83735 ASSAY OF MAGNESIUM: CPT

## 2023-08-17 PROCEDURE — 1160F RVW MEDS BY RX/DR IN RCRD: CPT | Performed by: FAMILY MEDICINE

## 2023-08-17 PROCEDURE — 85027 COMPLETE CBC AUTOMATED: CPT

## 2023-08-17 PROCEDURE — 1159F MED LIST DOCD IN RCRD: CPT | Performed by: FAMILY MEDICINE

## 2023-08-17 RX ORDER — MULTIPLE VITAMINS W/ MINERALS TAB 9MG-400MCG
1 TAB ORAL DAILY
COMMUNITY

## 2023-08-17 NOTE — Clinical Note
"I marked to the echocardiogram carotid Doppler and CT head as \"today\" orders.  I do not expect them to be done today.  However, I would prefer they be done in a timely manner.  It would be okay if needed for us to utilize [facility] for these tests.  Thanks."

## 2023-08-17 NOTE — PROGRESS NOTES
Sharon Tolliver presents to [facility].    Chief Complaint:  'I guess I passed out'    Subjective        History of Present Illness:  Sharon believes that she may have passed out about 2 weeks ago.  She says that she found herself on the floor.  She did have an injury to the back of her neck because of this.  She really is not sure what happened.  She did not feel bad before this episode occurred.  She says this happened when she stood up to go down the hallway toward her bathroom.  She was walking when it occurred.  She found herself on the floor.  She believes that the episode only lasted briefly.  There is no evidence of seizure activity.  She has also noted an increasing difficulty with fatigue with exertion.  There is been associated shortness of breath with this.  She denies any chest pain.    Review of Systems:  Review of Systems   Constitutional:  Negative for chills and fever.   Respiratory:  Positive for shortness of breath. Negative for cough.    Cardiovascular:  Negative for chest pain, palpitations and leg swelling.   Gastrointestinal:  Negative for abdominal pain, nausea and vomiting.      Objective   Medical History:  Past Medical History:    Chronic tension headaches    Depression with anxiety    Diverticulosis    Elevated cholesterol    Postmenopausal osteoporosis    Reflux esophagitis     Past Surgical History:    BREAST BIOPSY     SECTION    x2    COLONOSCOPY    Normal    ESOPHAGOGASTRECTOMY    UPPER GASTROINTESTINAL ENDOSCOPY      Family History   Problem Relation Age of Onset    Arthritis Mother     Alzheimer's disease Father     Stroke Father     BRCA 1/2 Neg Hx     Breast cancer Neg Hx     Colon cancer Neg Hx     Endometrial cancer Neg Hx     Ovarian cancer Neg Hx      Social History     Tobacco Use    Smoking status: Never    Smokeless tobacco: Never   Substance Use Topics    Alcohol use: No       Health Maintenance Due   Topic Date Due    DXA SCAN   "Never done    TDAP/TD VACCINES (1 - Tdap) Never done    PAP SMEAR  04/11/2021    COVID-19 Vaccine (5 - Moderna series) 01/13/2023    MAMMOGRAM  05/14/2023        Immunization History   Administered Date(s) Administered    COVID-19 (MODERNA) 1st,2nd,3rd Dose Monovalent 03/04/2021, 04/01/2021    COVID-19 (MODERNA) BIVALENT 12+YRS 09/13/2022    COVID-19 (MODERNA) Monovalent Original Booster 10/28/2021    Flu Vaccine Intradermal Quad 18-64YR 10/01/2016    Flu Vaccine Quad PF 6-35MO 10/01/2021    Fluad Quad 65+ 10/18/2022    Fluzone High-Dose 65+yrs 10/04/2021    Hepatitis A 05/23/2018    Influenza Quad Vaccine (Inpatient) 11/15/2017    Pneumococcal Conjugate 13-Valent (PCV13) 09/11/2020    Pneumococcal Polysaccharide (PPSV23) 10/04/2021    Shingrix 12/01/2021       No Known Allergies     Medications:  Current Outpatient Medications on File Prior to Visit   Medication Sig    alendronate (FOSAMAX) 70 MG tablet Take 1 tablet by mouth 1 (One) Time Per Week.    atorvastatin (LIPITOR) 10 MG tablet Take 1 tablet by mouth Every Night.    famotidine (PEPCID) 20 MG tablet Take 1 tablet by mouth 2 (Two) Times a Day.    multivitamin with minerals (MULTIVITAMIN ADULT PO) Take 1 tablet by mouth Daily.    vitamin B-12 (CYANOCOBALAMIN) 500 MCG tablet Take 1 tablet by mouth Daily.     No current facility-administered medications on file prior to visit.       Vital Signs:   /82 (BP Location: Left arm, Patient Position: Sitting)   Pulse 86   Temp 98.2 øF (36.8 øC) (Oral)   Ht 168.9 cm (66.5\")   Wt 72.1 kg (159 lb)   BMI 25.28 kg/mý       Physical Exam:  Physical Exam  Vitals and nursing note reviewed.   Constitutional:       General: She is not in acute distress.     Appearance: She is not ill-appearing.   HENT:      Right Ear: Tympanic membrane and ear canal normal.      Left Ear: Tympanic membrane and ear canal normal.      Mouth/Throat:      Mouth: Mucous membranes are moist.      Comments: Pharynx appears normal  Eyes:      " Extraocular Movements: Extraocular movements intact.      Pupils: Pupils are equal, round, and reactive to light.   Neck:      Thyroid: No thyromegaly.   Cardiovascular:      Rate and Rhythm: Normal rate and regular rhythm.      Heart sounds: No murmur heard.  Pulmonary:      Effort: Pulmonary effort is normal.      Breath sounds: Normal breath sounds.   Abdominal:      General: There is no distension.      Palpations: Abdomen is soft. There is no mass.      Tenderness: There is no abdominal tenderness.   Musculoskeletal:      Cervical back: Normal range of motion.   Skin:     Findings: No lesion or rash.   Neurological:      General: No focal deficit present.      Mental Status: She is oriented to person, place, and time.      Cranial Nerves: No cranial nerve deficit.   Psychiatric:         Mood and Affect: Mood normal.       Result Review      The following data was reviewed by Emigdio Mcwilliams MD on 08/17/2023.  Lab Results   Component Value Date    WBC 5.00 02/27/2023    HGB 11.4 (L) 02/27/2023    HCT 36.4 02/27/2023    MCV 84.1 02/27/2023     02/27/2023     Lab Results   Component Value Date    GLUCOSE 92 02/27/2023    BUN 14 02/27/2023    CREATININE 0.98 02/27/2023     02/27/2023    K 4.4 02/27/2023     02/27/2023    CO2 23.9 02/27/2023    CALCIUM 9.4 02/27/2023    PROTEINTOT 6.7 02/27/2023    ALBUMIN 4.7 02/27/2023    ALT 10 02/27/2023    AST 14 02/27/2023    ALKPHOS 52 02/27/2023    BILITOT 0.3 02/27/2023    EGFR 63.0 02/27/2023    GLOB 2.0 02/27/2023    AGRATIO 2.4 02/27/2023    BCR 14.3 02/27/2023    ANIONGAP 12.1 02/27/2023      Lab Results   Component Value Date    CHOL 153 02/27/2023    CHLPL 174 10/03/2020    TRIG 98 02/27/2023    HDL 55 02/27/2023    LDL 80 02/27/2023     Lab Results   Component Value Date    TSH 1.260 02/27/2023     No results found for: HGBA1C    ECG 12 Lead    Date/Time: 8/17/2023 10:50 AM  Performed by: Emigdio Mcwilliams MD  Authorized by: Oj,  Emigdio Rivera MD   Comparison: not compared with previous ECG   Previous ECG: no previous ECG available  Rhythm: sinus rhythm  Rate: normal  BPM: 73  Conduction: conduction normal  ST Segments: ST segments normal  T Waves: T waves normal  T inversion: III  QRS axis: normal  Other: no other findings    Clinical impression: normal ECG  Clinical impression comment: This is an essentially normal EKG.  Nonspecific T wave inversion in lead III is present.  No comparison is available.          Assessment and Plan:   Today, we have reviewed her care.  I am concerned by her description of what occurred.  It sounds like she had a syncopal episode that was not triggered.  Because of this, I would recommend moving ahead with additional evaluation as noted below.  I want to rule out obvious causes for this.  I am likely to send her to cardiology for second opinion and consideration for additional evaluation, but I would like to do some testing prior to that.  We will see what these test show and move forward.  No other short-term change is anticipated.       Diagnoses and all orders for this visit:    1. Syncope, unspecified syncope type (Primary)  -     CBC (No Diff); Future  -     Comprehensive Metabolic Panel; Future  -     Magnesium; Future  -     TSH; Future  -     ECG 12 Lead  -     Holter monitor - 48 hour; Future  -     Adult Transthoracic Echo Complete W/ Cont if Necessary Per Protocol; Future  -     Duplex Carotid Ultrasound CAR; Future  -     CT Head Without Contrast; Future    2. Fatigue, unspecified type  -     TSH; Future    3. Dyspnea on exertion  -     ECG 12 Lead  -     Adult Transthoracic Echo Complete W/ Cont if Necessary Per Protocol; Future    Follow Up   Return if symptoms worsen or fail to improve.  Patient was given instructions and counseling regarding her condition or for health maintenance advice. Please see specific information pulled into the AVS if appropriate.

## 2023-08-21 DIAGNOSIS — R55 SYNCOPE, UNSPECIFIED SYNCOPE TYPE: ICD-10-CM

## 2023-08-22 DIAGNOSIS — R06.09 DYSPNEA ON EXERTION: ICD-10-CM

## 2023-08-22 DIAGNOSIS — R55 SYNCOPE, UNSPECIFIED SYNCOPE TYPE: ICD-10-CM

## 2023-08-24 ENCOUNTER — TELEPHONE (OUTPATIENT)
Dept: FAMILY MEDICINE CLINIC | Age: 68
End: 2023-08-24
Payer: MEDICARE

## 2023-08-24 DIAGNOSIS — R06.09 DYSPNEA ON EXERTION: ICD-10-CM

## 2023-08-24 DIAGNOSIS — R55 SYNCOPE, UNSPECIFIED SYNCOPE TYPE: Primary | ICD-10-CM

## 2023-08-24 DIAGNOSIS — R53.83 FATIGUE, UNSPECIFIED TYPE: ICD-10-CM

## 2023-08-29 ENCOUNTER — TELEPHONE (OUTPATIENT)
Dept: VASCULAR SURGERY | Facility: HOSPITAL | Age: 68
End: 2023-08-29
Payer: MEDICARE

## 2023-08-29 PROCEDURE — 82274 ASSAY TEST FOR BLOOD FECAL: CPT

## 2023-08-29 NOTE — TELEPHONE ENCOUNTER
Caller: DEVORA       Relationship: PATIENT    Best call back number: [phone]    What is the best time to reach you: ANY    Who are you requesting to speak with (clinical staff, provider,  specific staff member): CLINICAL    Do you know the name of the person who called: PT    What was the call regarding: PT CALLED TO ADVISE THAT SHE WAS TOLD OUR OFFICE HAS TO CALL AND REQUEST MEDICAL RECORDS. THEIR FAX NUMBER [phone] FOR [email or web address].    Is it okay if the provider responds through MyChart: NO

## 2023-08-30 ENCOUNTER — LAB (OUTPATIENT)
Dept: LAB | Facility: HOSPITAL | Age: 68
End: 2023-08-30
Payer: MEDICARE

## 2023-08-30 DIAGNOSIS — D64.9 ANEMIA, UNSPECIFIED TYPE: ICD-10-CM

## 2023-08-30 LAB — HEMOCCULT STL QL IA: NEGATIVE

## 2023-08-30 PROCEDURE — 82274 ASSAY TEST FOR BLOOD FECAL: CPT

## 2023-08-31 ENCOUNTER — PATIENT MESSAGE (OUTPATIENT)
Dept: FAMILY MEDICINE CLINIC | Age: 68
End: 2023-08-31
Payer: MEDICARE

## 2023-09-08 ENCOUNTER — OFFICE VISIT (OUTPATIENT)
Dept: VASCULAR SURGERY | Facility: HOSPITAL | Age: 68
End: 2023-09-08
Payer: MEDICARE

## 2023-09-08 VITALS
HEART RATE: 62 BPM | OXYGEN SATURATION: 99 % | TEMPERATURE: 96.9 F | RESPIRATION RATE: 18 BRPM | SYSTOLIC BLOOD PRESSURE: 132 MMHG | DIASTOLIC BLOOD PRESSURE: 80 MMHG

## 2023-09-08 DIAGNOSIS — I65.21 STENOSIS OF RIGHT CAROTID ARTERY: Primary | ICD-10-CM

## 2023-09-08 PROCEDURE — G0463 HOSPITAL OUTPT CLINIC VISIT: HCPCS | Performed by: NURSE PRACTITIONER

## 2023-09-08 NOTE — PROGRESS NOTES
[facility] Surgery New Patient Office Note     Date of Encounter: 09/08/2023     MRN Number: [id number]  Name: Sharon Tolliver  Phone Number: [phone]     Referred By: Emigdio Mcwilliams,*  PCP: Emigdio Mcwilliams MD    Chief Complaint:    Chief Complaint   Patient presents with    SYNCOPAL EPISODES     PATIENT IS HERE AS A NEW REFERRAL FROM PRIMARY CARE PROVIDER FOR SYNCOPAL EPISODES. PATIENT HAS HAD A RECENT DOPPLER COMPLETED AT [facility].        Subjective      History of Present Illness:    Sharon Tolliver is a 68 y.o. female presents as a referral from Dr. Emigdio Mcwilliams.  She states that she has had several episodes of generalized weakness and syncope.  She explains that overall she feels quite healthy and walks regularly.  She is not diabetic and is a non-smoker.  She denies any history or signs or symptoms to suggest CVA, TIA or amaurosis fugax.  No complaints of claudication or ischemic rest pain.  She does take a statin medication daily.    Review of Systems:  ROS  Review of Systems   Constitutional: Negative.   HENT: Negative.    Cardiovascular: Negative.    Respiratory: Negative.    Skin: Negative.    Musculoskeletal: Negative.    Gastrointestinal: Negative.    Neurological: Negative.    Psychiatric/Behavioral: Negative.     I have reviewed the following portions of the patient's history: problem list, current medications, allergies, past surgical history, past medical history, past social history, past family history, and ROS and confirm it's accurate.    Allergies:  No Known Allergies    Medications:      Current Outpatient Medications:     alendronate (FOSAMAX) 70 MG tablet, Take 1 tablet by mouth 1 (One) Time Per Week., Disp: , Rfl:     atorvastatin (LIPITOR) 10 MG tablet, Take 1 tablet by mouth Every Night., Disp: 90 tablet, Rfl: 3    famotidine (PEPCID) 20 MG tablet, Take 1 tablet by mouth 2 (Two) Times a Day., Disp: , Rfl:     Ferrous Sulfate ER (Slow  Fe) 142 (45 Fe) MG tablet controlled-release, Take 142 mg by mouth Daily., Disp: 90 tablet, Rfl: 1    multivitamin with minerals tablet tablet, Take 1 tablet by mouth Daily., Disp: , Rfl:     vitamin B-12 (CYANOCOBALAMIN) 500 MCG tablet, Take 1 tablet by mouth Daily., Disp: , Rfl:     History:   Past Medical History:   Diagnosis Date    Chronic tension headaches     Depression with anxiety     Diverticulosis     Elevated cholesterol 2019    Postmenopausal osteoporosis     Reflux esophagitis        Past Surgical History:   Procedure Laterality Date    BREAST BIOPSY       SECTION      x2    COLONOSCOPY  2014    Normal    ESOPHAGOGASTRECTOMY      UPPER GASTROINTESTINAL ENDOSCOPY         Social History     Socioeconomic History    Marital status:     Number of children: 2   Tobacco Use    Smoking status: Never    Smokeless tobacco: Never   Vaping Use    Vaping Use: Never used   Substance and Sexual Activity    Alcohol use: No    Drug use: No        Family History   Problem Relation Age of Onset    Arthritis Mother     Alzheimer's disease Father     Stroke Father     BRCA 1/2 Neg Hx     Breast cancer Neg Hx     Colon cancer Neg Hx     Endometrial cancer Neg Hx     Ovarian cancer Neg Hx        Objective     Physical Exam:  Vitals:    23 1259   BP: 132/80   BP Location: Right arm   Patient Position: Sitting   Cuff Size: Large Adult   Pulse: 62   Resp: 18   Temp: 96.9 °F (36.1 °C)   TempSrc: Temporal   SpO2: 99%   PainSc: 0-No pain      There is no height or weight on file to calculate BMI.    Physical Exam   Physical Exam  Constitutional:       Appearance: Normal appearance.   HENT:      Head: Normocephalic.   Cardiovascular:      Rate and Rhythm: Normal rate.      Pulses: Normal pulses.      Comments: Neck: No bruit.  Palpable radial pulses  Pulmonary:      Effort: Pulmonary effort is normal.   Musculoskeletal:         General: Normal range of motion.      Cervical back: Normal range of motion.    Skin:     General: Skin is warm and dry.      Capillary Refill: Capillary refill takes less than 2 seconds.      Comments:   Neurological:      General: No focal deficit present.      Mental Status: Alert and oriented to person, place, and time.   Psychiatric:         Mood and Affect: Mood normal.         Behavior: Behavior normal.  Imaging/Labs:    I have reviewed the results of the carotid duplex performed at [facility]. The duplex reveals a increased in the right internal carotid artery velocity of 141 cm/s with a trace of plaque identified at the bifurcation.    CT Head Without Contrast    Result Date: 8/21/2023  No acute intracranial process. Images reviewed, interpreted, and dictated by JULIANA Bansal MD    US CAROTID BLOOD FLOW BILATERAL    Result Date: 8/21/2023  Based on validated velocity criteria: Based on strict NASCET criteria, 50-69% stenosis of the mid right cervical ICA based on the absolute peak systolic velocity. Images reviewed, interpreted, and dictated by Marquis Pugh DO        Assessment / Plan      Assessment / Plan:  Diagnoses and all orders for this visit:    1. Stenosis of right carotid artery (Primary)     Ms. Tolliver had a mild increase in the velocity of the right ICA on an outside carotid study performed on 8/21/23.  I recommended follow-up in 1 year with a repeat carotid duplex.    I have answered all of her questions and she is in agreement with the plan at this time.  Thank you for allowing me to participate in your patient's care.    Patient Education: Stroke, signs and symptoms and when to seek immediate medical attention.      Follow Up:   No follow-ups on file.   Or sooner for any further concerns or worsening sign and symptoms. If unable to reach us in the office please dial 911 or go to the nearest emergency department.      TESSA Rosa  [facility] Vascular Surgery

## 2023-09-11 ENCOUNTER — PATIENT MESSAGE (OUTPATIENT)
Dept: FAMILY MEDICINE CLINIC | Age: 68
End: 2023-09-11
Payer: MEDICARE

## 2023-09-11 DIAGNOSIS — D64.9 ANEMIA, UNSPECIFIED TYPE: ICD-10-CM

## 2023-09-11 RX ORDER — FERROUS SULFATE 137(45) MG
1 TABLET, EXTENDED RELEASE ORAL DAILY
Qty: 30 TABLET | Refills: 0 | Status: SHIPPED | OUTPATIENT
Start: 2023-09-11

## 2023-11-06 ENCOUNTER — TELEPHONE (OUTPATIENT)
Dept: FAMILY MEDICINE CLINIC | Age: 68
End: 2023-11-06
Payer: MEDICARE

## 2023-12-15 ENCOUNTER — LAB (OUTPATIENT)
Dept: LAB | Facility: HOSPITAL | Age: 68
End: 2023-12-15
Payer: MEDICARE

## 2023-12-15 DIAGNOSIS — D64.9 ANEMIA, UNSPECIFIED TYPE: ICD-10-CM

## 2023-12-15 LAB
DEPRECATED RDW RBC AUTO: 58.8 FL (ref 37–54)
ERYTHROCYTE [DISTWIDTH] IN BLOOD BY AUTOMATED COUNT: 19.6 % (ref 12.3–15.4)
HCT VFR BLD AUTO: 39 % (ref 34–46.6)
HGB BLD-MCNC: 11.9 G/DL (ref 12–15.9)
MCH RBC QN AUTO: 24.9 PG (ref 26.6–33)
MCHC RBC AUTO-ENTMCNC: 30.5 G/DL (ref 31.5–35.7)
MCV RBC AUTO: 81.6 FL (ref 79–97)
PLATELET # BLD AUTO: 210 10*3/MM3 (ref 140–450)
PMV BLD AUTO: 10.1 FL (ref 6–12)
RBC # BLD AUTO: 4.78 10*6/MM3 (ref 3.77–5.28)
WBC NRBC COR # BLD AUTO: 5.08 10*3/MM3 (ref 3.4–10.8)

## 2023-12-15 PROCEDURE — 36415 COLL VENOUS BLD VENIPUNCTURE: CPT

## 2023-12-15 PROCEDURE — 84466 ASSAY OF TRANSFERRIN: CPT

## 2023-12-15 PROCEDURE — 85027 COMPLETE CBC AUTOMATED: CPT

## 2023-12-15 PROCEDURE — 83540 ASSAY OF IRON: CPT

## 2023-12-16 LAB
IRON 24H UR-MRATE: 97 MCG/DL (ref 37–145)
IRON SATN MFR SERPL: 28 % (ref 20–50)
TIBC SERPL-MCNC: 344 MCG/DL (ref 298–536)
TRANSFERRIN SERPL-MCNC: 231 MG/DL (ref 200–360)

## 2024-02-26 DIAGNOSIS — E78.00 HYPERCHOLESTEREMIA: ICD-10-CM

## 2024-02-26 RX ORDER — ATORVASTATIN CALCIUM 10 MG/1
10 TABLET, FILM COATED ORAL NIGHTLY
Qty: 90 TABLET | Refills: 3 | Status: SHIPPED | OUTPATIENT
Start: 2024-02-26

## 2024-02-29 ENCOUNTER — LAB (OUTPATIENT)
Dept: LAB | Facility: HOSPITAL | Age: 69
End: 2024-02-29
Payer: MEDICARE

## 2024-02-29 ENCOUNTER — OFFICE VISIT (OUTPATIENT)
Dept: FAMILY MEDICINE CLINIC | Age: 69
End: 2024-02-29
Payer: MEDICARE

## 2024-02-29 VITALS
WEIGHT: 157 LBS | HEIGHT: 66 IN | DIASTOLIC BLOOD PRESSURE: 75 MMHG | HEART RATE: 68 BPM | TEMPERATURE: 98.2 F | BODY MASS INDEX: 25.23 KG/M2 | SYSTOLIC BLOOD PRESSURE: 135 MMHG

## 2024-02-29 DIAGNOSIS — E78.00 HYPERCHOLESTEROLEMIA: ICD-10-CM

## 2024-02-29 DIAGNOSIS — Z12.11 SCREEN FOR COLON CANCER: ICD-10-CM

## 2024-02-29 DIAGNOSIS — R79.89 ELEVATED SERUM CREATININE: ICD-10-CM

## 2024-02-29 DIAGNOSIS — Z00.00 PHYSICAL EXAM: Primary | ICD-10-CM

## 2024-02-29 DIAGNOSIS — D50.0 IRON DEFICIENCY ANEMIA DUE TO CHRONIC BLOOD LOSS: ICD-10-CM

## 2024-02-29 DIAGNOSIS — H61.23 BILATERAL IMPACTED CERUMEN: ICD-10-CM

## 2024-02-29 LAB
ALBUMIN SERPL-MCNC: 4.4 G/DL (ref 3.5–5.2)
ALBUMIN/GLOB SERPL: 1.6 G/DL
ALP SERPL-CCNC: 60 U/L (ref 39–117)
ALT SERPL W P-5'-P-CCNC: 14 U/L (ref 1–33)
ANION GAP SERPL CALCULATED.3IONS-SCNC: 11.7 MMOL/L (ref 5–15)
AST SERPL-CCNC: 20 U/L (ref 1–32)
BILIRUB SERPL-MCNC: 0.4 MG/DL (ref 0–1.2)
BUN SERPL-MCNC: 17 MG/DL (ref 8–23)
BUN/CREAT SERPL: 17.2 (ref 7–25)
CALCIUM SPEC-SCNC: 9.9 MG/DL (ref 8.6–10.5)
CHLORIDE SERPL-SCNC: 104 MMOL/L (ref 98–107)
CHOLEST SERPL-MCNC: 156 MG/DL (ref 0–200)
CO2 SERPL-SCNC: 25.3 MMOL/L (ref 22–29)
CREAT SERPL-MCNC: 0.99 MG/DL (ref 0.57–1)
DEPRECATED RDW RBC AUTO: 47.1 FL (ref 37–54)
EGFRCR SERPLBLD CKD-EPI 2021: 61.9 ML/MIN/1.73
ERYTHROCYTE [DISTWIDTH] IN BLOOD BY AUTOMATED COUNT: 14.5 % (ref 12.3–15.4)
GLOBULIN UR ELPH-MCNC: 2.7 GM/DL
GLUCOSE SERPL-MCNC: 94 MG/DL (ref 65–99)
HCT VFR BLD AUTO: 42.9 % (ref 34–46.6)
HDLC SERPL-MCNC: 55 MG/DL (ref 40–60)
HGB BLD-MCNC: 13.6 G/DL (ref 12–15.9)
LDLC SERPL CALC-MCNC: 87 MG/DL (ref 0–100)
LDLC/HDLC SERPL: 1.57 {RATIO}
MCH RBC QN AUTO: 27.8 PG (ref 26.6–33)
MCHC RBC AUTO-ENTMCNC: 31.7 G/DL (ref 31.5–35.7)
MCV RBC AUTO: 87.7 FL (ref 79–97)
PLATELET # BLD AUTO: 211 10*3/MM3 (ref 140–450)
PMV BLD AUTO: 10.1 FL (ref 6–12)
POTASSIUM SERPL-SCNC: 4.3 MMOL/L (ref 3.5–5.2)
PROT SERPL-MCNC: 7.1 G/DL (ref 6–8.5)
RBC # BLD AUTO: 4.89 10*6/MM3 (ref 3.77–5.28)
SODIUM SERPL-SCNC: 141 MMOL/L (ref 136–145)
TRIGL SERPL-MCNC: 74 MG/DL (ref 0–150)
TSH SERPL DL<=0.05 MIU/L-ACNC: 1.24 UIU/ML (ref 0.27–4.2)
VLDLC SERPL-MCNC: 14 MG/DL (ref 5–40)
WBC NRBC COR # BLD AUTO: 5 10*3/MM3 (ref 3.4–10.8)

## 2024-02-29 PROCEDURE — 36415 COLL VENOUS BLD VENIPUNCTURE: CPT

## 2024-02-29 PROCEDURE — 84443 ASSAY THYROID STIM HORMONE: CPT

## 2024-02-29 PROCEDURE — 80061 LIPID PANEL: CPT

## 2024-02-29 PROCEDURE — 80053 COMPREHEN METABOLIC PANEL: CPT

## 2024-02-29 PROCEDURE — 85027 COMPLETE CBC AUTOMATED: CPT

## 2024-02-29 NOTE — PROGRESS NOTES
The ABCs of the Annual Wellness Visit  Subsequent Medicare Wellness Visit    Subjective    Sharon Tolliver is a 69 y.o. female who presents for a Subsequent Medicare Wellness Visit.    The following portions of the patient's history were reviewed and updated as appropriate: allergies, current medications, past family history, past medical history, past social history, past surgical history, and problem list.    Compared to one year ago, the patient feels her physical health is the same.    Compared to one year ago, the patient feels her mental health is the same.    Recent Hospitalizations:  She was not admitted to the hospital during the last year.     Current Medical Providers:  Patient Care Team:  Emigdio Mcwilliams MD as PCP - General (Family Medicine)  Iliana Malone MD as Consulting Physician (Obstetrics and Gynecology)    Outpatient Medications Prior to Visit   Medication Sig Dispense Refill   • alendronate (FOSAMAX) 70 MG tablet Take 1 tablet by mouth 1 (One) Time Per Week.     • atorvastatin (LIPITOR) 10 MG tablet TAKE ONE TABLET BY MOUTH ONCE NIGHTLY 90 tablet 3   • famotidine (PEPCID) 20 MG tablet Take 1 tablet by mouth 2 (Two) Times a Day.     • multivitamin with minerals tablet tablet Take 1 tablet by mouth Daily.     • vitamin B-12 (CYANOCOBALAMIN) 500 MCG tablet Take 1 tablet by mouth Daily.     • Ferrous Sulfate ER (Slow Fe) 142 (45 Fe) MG tablet controlled-release Take 142 mg by mouth Daily. 30 tablet 0     No facility-administered medications prior to visit.       No opioid medication identified on active medication list. I have reviewed chart for other potential  high risk medication/s and harmful drug interactions in the elderly.      Aspirin is not on active medication list.  Aspirin use is not indicated based on review of current medical condition/s. Risk of harm outweighs potential benefits.  .    Patient Active Problem List   Diagnosis   • Gastroesophageal reflux disease   •  "Osteopenia of multiple sites   • Dyspareunia due to medical condition in female   • Hypercholesterolemia   • Depressive disorder     Advance Care Planning  Advance Directive is not on file.  ACP discussion was held with the patient during this visit. Patient has an advance directive (not in EMR), copy requested.  Her sister, Isabelle, would make decisions if needed.     Objective    Vitals:    24 1053   BP: 135/75   BP Location: Right arm   Patient Position: Sitting   Pulse: 68   Temp: 98.2 °F (36.8 °C)   TempSrc: Oral   Weight: 71.2 kg (157 lb)   Height: 168.9 cm (66.5\")   PainSc: 0-No pain     Estimated body mass index is 24.96 kg/m² as calculated from the following:    Height as of this encounter: 168.9 cm (66.5\").    Weight as of this encounter: 71.2 kg (157 lb).    BMI is within normal parameters. No other follow-up for BMI required.    Does the patient have evidence of cognitive impairment? No        HEALTH RISK ASSESSMENT    Smoking Status:  Social History     Tobacco Use   Smoking Status Never   Smokeless Tobacco Never     Alcohol Consumption:  Social History     Substance and Sexual Activity   Alcohol Use No     Fall Risk Screen:    STEADI Fall Risk Assessment was completed, and patient is at LOW risk for falls.Assessment completed on:2024    Depression Screenin/29/2024    10:52 AM   PHQ-2/PHQ-9 Depression Screening   Little Interest or Pleasure in Doing Things 0-->not at all   Feeling Down, Depressed or Hopeless 0-->not at all   PHQ-9: Brief Depression Severity Measure Score 0       Health Habits and Functional and Cognitive Screenin/29/2024    10:52 AM   Functional & Cognitive Status   Do you have difficulty preparing food and eating? No   Do you have difficulty bathing yourself, getting dressed or grooming yourself? No   Do you have difficulty using the toilet? No   Do you have difficulty moving around from place to place? No   Do you have trouble with steps or getting out of a " bed or a chair? No   Current Diet Unhealthy Diet   Dental Exam Up to date   Eye Exam Up to date   Exercise (times per week) 3 times per week   Current Exercises Include Walking   Do you need help using the phone?  No   Are you deaf or do you have serious difficulty hearing?  No   Do you need help to go to places out of walking distance? No   Do you need help shopping? No   Do you need help preparing meals?  No   Do you need help with housework?  No   Do you need help with laundry? No   Do you need help taking your medications? No   Do you need help managing money? No   Do you ever drive or ride in a car without wearing a seat belt? No   Have you felt unusual stress, anger or loneliness in the last month? No   Who do you live with? Alone   If you need help, do you have trouble finding someone available to you? No   Have you been bothered in the last four weeks by sexual problems? No   Do you have difficulty concentrating, remembering or making decisions? No       Age-appropriate Screening Schedule:  Refer to the list below for future screening recommendations based on patient's age, sex and/or medical conditions. Orders for these recommended tests are listed in the plan section. The patient has been provided with a written plan.    Health Maintenance   Topic Date Due   • DXA SCAN  Never done   • TDAP/TD VACCINES (1 - Tdap) Never done   • RSV Vaccine - Adults (1 - 1-dose 60+ series) Never done   • PAP SMEAR  04/11/2021   • MAMMOGRAM  05/14/2023   • LIPID PANEL  02/27/2024   • COLORECTAL CANCER SCREENING  06/16/2024   • ANNUAL WELLNESS VISIT  02/28/2025   • HEPATITIS C SCREENING  Completed   • COVID-19 Vaccine  Completed   • INFLUENZA VACCINE  Completed   • Pneumococcal Vaccine 65+  Completed   • ZOSTER VACCINE  Discontinued                CMS Preventative Services Quick Reference  Risk Factors Identified During Encounter  Immunizations Discussed/Encouraged: Tdap and RSV (Respiratory Syncytial Virus)  The above  "risks/problems have been discussed with the patient.  Pertinent information has been shared with the patient in the After Visit Summary.  An After Visit Summary and PPPS were made available to the patient.    Follow Up:   Next Medicare Wellness visit to be scheduled in 1 year.     Additional E&M Note during same encounter follows:  Patient has multiple medical problems which are significant and separately identifiable that require additional work above and beyond the Medicare Wellness Visit.      Chief Complaint:  Cholesterol    Subjective    History of Present Illness:  In addition to the Medicare wellness exam, Sharon is also here for follow-up on her usual care.  She has elevated cholesterol for which she remains on statin therapy.  She is unaware of any side effects from the medication.    She was noted to have a an iron deficiency anemia last year.  This was felt to be primarily due to blood donation.  She was taking an iron supplement, but she has not been on that for some time now.    Review of Systems:  Review of Systems   Constitutional:  Negative for chills and fever.   Respiratory:  Negative for cough and shortness of breath.    Cardiovascular:  Negative for chest pain and palpitations.   Gastrointestinal:  Negative for abdominal pain, nausea and vomiting.      Objective   Vital Signs:  /75 (BP Location: Right arm, Patient Position: Sitting)   Pulse 68   Temp 98.2 °F (36.8 °C) (Oral)   Ht 168.9 cm (66.5\")   Wt 71.2 kg (157 lb)   BMI 24.96 kg/m²     Physical Exam  Vitals and nursing note reviewed.   Constitutional:       General: She is not in acute distress.     Appearance: She is not ill-appearing.   HENT:      Right Ear: External ear normal. There is impacted cerumen.      Left Ear: External ear normal. There is impacted cerumen.      Ears:      Comments: Hearing is normal with forced whisper bilaterally.     Mouth/Throat:      Mouth: Mucous membranes are moist.      Comments: Pharynx " "appears normal  Eyes:      Extraocular Movements: Extraocular movements intact.      Pupils: Pupils are equal, round, and reactive to light.      Comments: Binocular vision is 20/25 with correction   Neck:      Thyroid: No thyromegaly.   Cardiovascular:      Rate and Rhythm: Normal rate and regular rhythm.      Heart sounds: No murmur heard.  Pulmonary:      Effort: Pulmonary effort is normal.      Breath sounds: Normal breath sounds.   Abdominal:      General: There is no distension.      Palpations: Abdomen is soft. There is no mass.      Tenderness: There is no abdominal tenderness.   Musculoskeletal:      Cervical back: Normal range of motion.   Skin:     Findings: No lesion or rash.   Neurological:      General: No focal deficit present.      Mental Status: She is oriented to person, place, and time.      Cranial Nerves: No cranial nerve deficit.   Psychiatric:         Mood and Affect: Mood normal.     The following data was reviewed by Emigdio Mcwilliams MD on 02/29/2024.  Lab Results   Component Value Date    WBC 5.08 12/15/2023    HGB 11.9 (L) 12/15/2023    HCT 39.0 12/15/2023    MCV 81.6 12/15/2023     12/15/2023     Lab Results   Component Value Date    GLUCOSE 93 08/17/2023    BUN 15 08/17/2023    CREATININE 1.04 (H) 08/17/2023     08/17/2023    K 4.1 08/17/2023     08/17/2023    CO2 27.0 08/17/2023    CALCIUM 9.7 08/17/2023    PROTEINTOT 7.1 08/17/2023    ALBUMIN 4.6 08/17/2023    ALT 16 08/17/2023    AST 20 08/17/2023    ALKPHOS 59 08/17/2023    BILITOT 0.3 08/17/2023    EGFR 58.7 (L) 08/17/2023    GLOB 2.5 08/17/2023    AGRATIO 1.8 08/17/2023    BCR 14.4 08/17/2023    ANIONGAP 10.0 08/17/2023      Lab Results   Component Value Date    CHOL 153 02/27/2023    CHLPL 174 10/03/2020    TRIG 98 02/27/2023    HDL 55 02/27/2023    LDL 80 02/27/2023     Lab Results   Component Value Date    TSH 2.410 08/17/2023     No results found for: \"HGBA1C\"     Ear Cerumen Removal    Date/Time: " 2/29/2024 11:50 AM    Performed by: Radha Elder RN  Authorized by: Emigdio Mcwilliams MD    Anesthesia:  Local Anesthetic: none  Location details: left ear and right ear  Patient tolerance: patient tolerated the procedure well with no immediate complications  Procedure type: irrigation   Sedation:  Patient sedated: no          Assessment and Plan:   Today, we have reviewed her care.  Overall, Sharon seems to be doing very well.  Regarding the Medicare wellness exam, she will be due for colonoscopy in June, and we will make referral to see general surgery locally to consider this.  We also reviewed vaccines as noted above.    Regarding her usual care, her atorvastatin has been refilled in the last few days.  We will move forward with labs as noted below.  I want to recheck her blood counts and make sure that it is stable.  Her ears will also be flushed.    Diagnoses and all orders for this visit:    1. Physical exam (Primary)    2. Hypercholesterolemia  -     Comprehensive Metabolic Panel; Future  -     Lipid Panel; Future  -     TSH; Future    3. Iron deficiency anemia due to chronic blood loss  -     CBC (No Diff); Future    4. Elevated serum creatinine  -     Comprehensive Metabolic Panel; Future    5. Screen for colon cancer  -     Ambulatory Referral For Screening Colonoscopy    6. Bilateral impacted cerumen  -     Ear Cerumen Removal       Follow Up  Return in about 1 year (around 2/28/2025) for Recheck, Medicare Wellness.  Patient was given instructions and counseling regarding her condition or for health maintenance advice. Please see specific information pulled into the AVS if appropriate.

## 2024-02-29 NOTE — Clinical Note
Please reach out to [facility] for a copy of her most recent Pap smear, DEXA scan, and mammogram.  Thanks

## 2024-08-07 ENCOUNTER — TELEPHONE (OUTPATIENT)
Dept: VASCULAR SURGERY | Facility: HOSPITAL | Age: 69
End: 2024-08-07
Payer: MEDICARE

## 2024-08-22 ENCOUNTER — HOSPITAL ENCOUNTER (OUTPATIENT)
Dept: GENERAL RADIOLOGY | Facility: HOSPITAL | Age: 69
Discharge: HOME | End: 2024-08-22
Admitting: NURSE PRACTITIONER
Payer: MEDICARE

## 2024-08-22 ENCOUNTER — OFFICE VISIT (OUTPATIENT)
Dept: FAMILY MEDICINE CLINIC | Age: 69
End: 2024-08-22
Payer: MEDICARE

## 2024-08-22 VITALS
TEMPERATURE: 98.1 F | SYSTOLIC BLOOD PRESSURE: 160 MMHG | OXYGEN SATURATION: 100 % | DIASTOLIC BLOOD PRESSURE: 87 MMHG | HEIGHT: 67 IN | BODY MASS INDEX: 24.2 KG/M2 | HEART RATE: 73 BPM | WEIGHT: 154.2 LBS

## 2024-08-22 DIAGNOSIS — R55 SYNCOPE, UNSPECIFIED SYNCOPE TYPE: ICD-10-CM

## 2024-08-22 DIAGNOSIS — M54.50 ACUTE RIGHT-SIDED LOW BACK PAIN WITHOUT SCIATICA: Primary | ICD-10-CM

## 2024-08-22 DIAGNOSIS — M54.50 ACUTE RIGHT-SIDED LOW BACK PAIN WITHOUT SCIATICA: ICD-10-CM

## 2024-08-22 DIAGNOSIS — R09.81 SINUS CONGESTION: ICD-10-CM
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
## 2024-08-22 LAB — SARS-COV-2 RNA RESP QL NAA+PROBE: DETECTED

## 2024-08-22 PROCEDURE — 87635 SARS-COV-2 COVID-19 AMP PRB: CPT | Performed by: NURSE PRACTITIONER

## 2024-08-22 PROCEDURE — 72100 X-RAY EXAM L-S SPINE 2/3 VWS: CPT

## 2024-08-22 NOTE — PROGRESS NOTES
"Chief Complaint  Fall (X 5 days ago, pt is experiencing back pain )    Subjective      Sharon Tolliver is a 69 year old female that presents to [facility] with c/o back pain after she had a fall. States she got up to go to the bathroom and passed out. This occurred 5 days ago. She states prior to that she had been having some sinus congestion and not feeling well while on a cruise. She also admits to a prior syncopal episode during that time as well. She states that her head congestion is better now, she has not had any additional syncopal episodes. Her complaint today is that she continues to have low back pain since the fall. She denies chest pain, shortness of breath, visual disturbances or dizziness.   States other passengers on the cruise were experiencing similar symptoms.      History of Present Illness    Current Outpatient Medications   Medication Instructions    alendronate (FOSAMAX) 70 MG tablet 1 tablet, Oral, Weekly    atorvastatin (LIPITOR) 10 mg, Oral, Nightly    famotidine (PEPCID) 20 mg, Oral, 2 Times Daily    multivitamin with minerals tablet tablet 1 tablet, Oral, Daily    vitamin B-12 (CYANOCOBALAMIN) 500 mcg, Oral, Daily       The following portions of the patient's history were reviewed and updated as appropriate: allergies, current medications, past family history, past medical history, past social history, past surgical history, and problem list.    Objective   Vital Signs:   /87 (BP Location: Right arm, Patient Position: Sitting)   Pulse 73   Temp 98.1 °F (36.7 °C) (Oral)   Ht 168.9 cm (66.5\")   Wt 69.9 kg (154 lb 3.2 oz)   SpO2 100%   BMI 24.52 kg/m²     Wt Readings from Last 3 Encounters:   08/22/24 69.9 kg (154 lb 3.2 oz)   02/29/24 71.2 kg (157 lb)   08/17/23 72.1 kg (159 lb)     BP Readings from Last 3 Encounters:   08/22/24 160/87   02/29/24 135/75   09/08/23 132/80     Physical Exam  Vitals and nursing note reviewed.   Constitutional:  "      Appearance: Normal appearance. She is not ill-appearing.   HENT:      Head: Normocephalic and atraumatic.      Right Ear: External ear normal.      Left Ear: External ear normal.   Cardiovascular:      Rate and Rhythm: Normal rate and regular rhythm.      Heart sounds: Normal heart sounds.   Pulmonary:      Effort: Pulmonary effort is normal.      Breath sounds: Normal breath sounds.   Musculoskeletal:        Arms:       Comments: No visible signs of injury   Skin:     General: Skin is warm and dry.      Capillary Refill: Capillary refill takes less than 2 seconds.   Neurological:      Mental Status: She is alert and oriented to person, place, and time.   Psychiatric:         Mood and Affect: Mood normal.         Behavior: Behavior normal.          Result Review :  The following data was reviewed by: TESSA Arora on 08/22/2024:      Common labs          12/15/2023    15:13 2/29/2024    12:18   Common Labs   Glucose  94    BUN  17    Creatinine  0.99    Sodium  141    Potassium  4.3    Chloride  104    Calcium  9.9    Albumin  4.4    Total Bilirubin  0.4    Alkaline Phosphatase  60    AST (SGOT)  20    ALT (SGPT)  14    WBC 5.08  5.00    Hemoglobin 11.9  13.6    Hematocrit 39.0  42.9    Platelets 210  211    Total Cholesterol  156    Triglycerides  74    HDL Cholesterol  55    LDL Cholesterol   87        Lab Results (last 72 hours)       Procedure Component Value Units Date/Time    COVID-19,CEPHEID/BRIDGET,COR/MIKE/PAD/MIKI/LAG/CARLY IN-HOUSE,NP SWAB IN TRANSPORT MEDIA 1 HR TAT, RT-PCR - Swab, Nasopharynx [[id number]] Collected: 08/22/24 1443    Specimen: Swab from Nasopharynx Updated: 08/22/24 1444             No Images in the past 120 days found..    Lab Results   Component Value Date    BILIRUBINUR Negative 05/15/2023         ECG 12 Lead    Date/Time: 8/22/2024 2:59 PM  Performed by: Mony Verdin APRN    Authorized by: Mony Verdin APRN  Comparison: compared with previous ECG from  8/17/2023  Similar to previous ECG  Rhythm: sinus rhythm  Rate: normal  BPM: 72  Conduction: conduction normal  ST Segments: ST segments normal  T Waves: T waves normal  QRS axis: normal    Clinical impression: normal ECG              Assessment and Plan   Diagnoses and all orders for this visit:    1. Acute right-sided low back pain without sciatica (Primary)  -     XR Spine Lumbar 2 or 3 View; Future    2. Fall, initial encounter  -     XR Spine Lumbar 2 or 3 View; Future    3. Syncope, unspecified syncope type  -     COVID-19,CEPHEID/BRIDGET,COR/MIKE/PAD/MIKI/LAG/CARLY IN-HOUSE,NP SWAB IN TRANSPORT MEDIA 1 HR TAT, RT-PCR - Swab, Nasopharynx; Future  -     ECG 12 Lead  -     COVID-19,CEPHEID/BRIDGET,COR/MIKE/PAD/MIKI/LAG/CARLY IN-HOUSE,NP SWAB IN TRANSPORT MEDIA 1 HR TAT, RT-PCR - Swab, Nasopharynx    4. Sinus congestion  -     COVID-19,CEPHEID/BRIDGET,COR/MIKE/PAD/MIKI/LAG/CARLY IN-HOUSE,NP SWAB IN TRANSPORT MEDIA 1 HR TAT, RT-PCR - Swab, Nasopharynx; Future  -     COVID-19,CEPHEID/BRIDGET,COR/MIKE/PAD/MIKI/LAG/CARLY IN-HOUSE,NP SWAB IN TRANSPORT MEDIA 1 HR TAT, RT-PCR - Swab, Nasopharynx      Syncopal episode x 2 in the last week but in conjunction with sick symptoms. Suspect viral etiology. Will get a covid PCR given pts recent travel. It looks like she has had similar episode about 1 year go for which Dr. Mcwilliams worked her up. She did f/u with vascular as well and was cleared to f/u in 1 year.     BMI is within normal parameters. No other follow-up for BMI required.         There are no discontinued medications.       Follow Up   No follow-ups on file.  Patient was given instructions and counseling regarding her condition or for health maintenance advice. Please see specific information pulled into the AVS if appropriate.       Mony Verdin, TESSA  08/22/24  15:25 EDT

## 2024-08-23 DIAGNOSIS — S22.080A COMPRESSION FRACTURE OF T12 VERTEBRA, INITIAL ENCOUNTER: Primary | ICD-10-CM

## 2024-09-04 ENCOUNTER — OFFICE VISIT (OUTPATIENT)
Dept: NEUROSURGERY | Facility: CLINIC | Age: 69
End: 2024-09-04
Payer: MEDICARE

## 2024-09-04 VITALS
DIASTOLIC BLOOD PRESSURE: 76 MMHG | SYSTOLIC BLOOD PRESSURE: 135 MMHG | WEIGHT: 156 LBS | HEART RATE: 89 BPM | BODY MASS INDEX: 24.48 KG/M2 | HEIGHT: 67 IN

## 2024-09-04 DIAGNOSIS — S22.080A CLOSED WEDGE COMPRESSION FRACTURE OF T12 VERTEBRA, INITIAL ENCOUNTER: Primary | ICD-10-CM

## 2024-09-04 PROCEDURE — 1160F RVW MEDS BY RX/DR IN RCRD: CPT | Performed by: PHYSICIAN ASSISTANT

## 2024-09-04 PROCEDURE — 99204 OFFICE O/P NEW MOD 45 MIN: CPT | Performed by: PHYSICIAN ASSISTANT

## 2024-09-04 PROCEDURE — 1159F MED LIST DOCD IN RCRD: CPT | Performed by: PHYSICIAN ASSISTANT

## 2024-09-04 NOTE — PROGRESS NOTES
"Chief Complaint  Back Pain    Subjective          Sharon Tolliver who is a 69 y.o. year old female who presents to [facility] for Evaluation of the Spine.     - Reason for consultation: T12 compression fracture (fall on 08/07/2024)  - Pain:     - Back pain, improved but not resolved    - Current pain 8.5/10    - Worse with standing/activity    - No leg pain, numbness, tingling, weakness    - No bowel/bladder issues  - Alleviating factors: Lying down, pulling legs up  - PMHx: Osteopenia  - Medications: Weekly medication for osteopenia, Aleve for pain (helps)  - Social Hx: Non-smoker    Was this the result of an injury or accident?: Yes, Fall on 8/18/24.    History of Previous Spinal Surgery?: No     reports that she has never smoked. She has never used smokeless tobacco.    Review of Systems   Musculoskeletal:  Positive for back pain.        Objective   Vital Signs:   /76 (BP Location: Left arm, Patient Position: Sitting, Cuff Size: Adult)   Pulse 89   Ht 168.9 cm (66.5\")   Wt 70.8 kg (156 lb)   BMI 24.80 kg/m²       Physical Exam  Constitutional:       Appearance: Normal appearance. She is normal weight.   Pulmonary:      Effort: Pulmonary effort is normal.   Musculoskeletal:         General: Tenderness (right lumbar area) present.   Neurological:      General: No focal deficit present.      Mental Status: She is alert and oriented to person, place, and time.      Sensory: No sensory deficit.      Motor: No weakness.      Deep Tendon Reflexes: Reflexes normal.   Psychiatric:         Mood and Affect: Mood normal.         Behavior: Behavior normal.        Neurologic Exam     Mental Status   Oriented to person, place, and time.        Result Review     I have personally interpreted the x-ray of the lumbar spine from 8/22/2024 which shows mild age-indeterminate T12 compression fracture.  Severe disc space narrowing at L5-S1.     Assessment and Plan    Diagnoses and " all orders for this visit:    1. Closed wedge compression fracture of T12 vertebra, initial encounter (Primary)  -     XR Spine Lumbar 2 or 3 View; Future    1. T12 Compression Fracture  - Likely new injury from fall on 08/07/2024  - Conservative management:    - Activity restrictions x 12 weeks (until 11/10/2024):      - No lifting > 5 lbs      - Avoid bending/twisting at waist      - Walking as tolerated    - Continue OTC pain meds    - TLSO brace discussed (declined today)  - F/U in 4-5 weeks  - Lumbar X-ray 2-3 days before next visit    2. Osteopenia  - Continue current tx  - Recent DEXA scan (08/2023)    3. Degenerative Disc Disease  - Noted on X-ray, currently asymptomatic    Additional Notes:  - Discussed natural healing process, surgery not indicated  - Reviewed activity modifications and warning signs  - To schedule follow-up at [place] office      Follow Up   Return in about 4 weeks (around 10/2/2024).  Patient was given instructions and counseling regarding her condition or for health maintenance advice. Please see specific information pulled into the AVS if appropriate.

## 2024-09-06 ENCOUNTER — PATIENT ROUNDING (BHMG ONLY) (OUTPATIENT)
Dept: NEUROSURGERY | Facility: CLINIC | Age: 69
End: 2024-09-06
Payer: MEDICARE

## 2024-10-08 ENCOUNTER — HOSPITAL ENCOUNTER (OUTPATIENT)
Dept: GENERAL RADIOLOGY | Facility: HOSPITAL | Age: 69
Discharge: HOME | End: 2024-10-08
Admitting: PHYSICIAN ASSISTANT
Payer: MEDICARE

## 2024-10-08 DIAGNOSIS — S22.080A CLOSED WEDGE COMPRESSION FRACTURE OF T12 VERTEBRA, INITIAL ENCOUNTER: ICD-10-CM

## 2024-10-08 PROCEDURE — 72100 X-RAY EXAM L-S SPINE 2/3 VWS: CPT

## 2024-10-11 ENCOUNTER — OFFICE VISIT (OUTPATIENT)
Dept: NEUROSURGERY | Facility: CLINIC | Age: 69
End: 2024-10-11
Payer: MEDICARE

## 2024-10-11 VITALS
BODY MASS INDEX: 24.91 KG/M2 | WEIGHT: 155 LBS | HEART RATE: 64 BPM | SYSTOLIC BLOOD PRESSURE: 137 MMHG | HEIGHT: 66 IN | OXYGEN SATURATION: 99 % | DIASTOLIC BLOOD PRESSURE: 76 MMHG

## 2024-10-11 DIAGNOSIS — S22.080D CLOSED WEDGE COMPRESSION FRACTURE OF T12 VERTEBRA WITH ROUTINE HEALING, SUBSEQUENT ENCOUNTER: Primary | ICD-10-CM

## 2024-10-11 NOTE — PROGRESS NOTES
Patient being seen for today for Follow-up (Closed wedge compression fracture of T12 vertebra)  .    Subjective    Sharon Tolliver is a 69 y.o. female that presents with Follow-up (Closed wedge compression fracture of T12 vertebra)  .    HPI  Previously: Last seen on 9/4/2024 for T12 compression fracture.  She reported a fall on 8/7/2024.  She declined back bracing.  There is plan to continue physical restrictions until 11/10/2024.  There is plan to follow-up in 4 weeks with x-ray to monitor the T12 bone.    Today: She reports improved pain in general, though she still has pain at times with certain movements. Denies new complaints.     reports that she has never smoked. She has never used smokeless tobacco.    Review of Systems   Musculoskeletal:  Positive for back pain.       Objective   Vitals:    10/11/24 1305   BP: 137/76   Pulse: 64   SpO2: 99%        Physical Exam  Constitutional:       Appearance: Normal appearance. She is obese.   Pulmonary:      Effort: Pulmonary effort is normal.   Musculoskeletal:         General: No tenderness.      Comments: SLR negative bilaterally   Neurological:      General: No focal deficit present.      Mental Status: She is alert and oriented to person, place, and time.      Sensory: No sensory deficit.      Motor: No weakness.      Deep Tendon Reflexes: Reflexes normal.   Psychiatric:         Mood and Affect: Mood normal.         Behavior: Behavior normal.          Result Review   I personally interpreted the x-ray of the lumbar spine from 10/8/2024 which shows stable T12 compression deformity.     Assessment and Plan {CC Problem List  Visit Diagnosis  ROS  Review (Popup)  Health Maintenance  Quality  BestPractice  Medications  SmartSets  SnapShot Encounters  Media :23}   Diagnoses and all orders for this visit:    1. Closed wedge compression fracture of T12 vertebra with routine healing, subsequent encounter (Primary)  -     XR Spine Lumbar 2 or 3 View;  Future    She does report improved pain, although not 100% pain free.    The T12 bones is stable on the x-ray.    I would have her continue physical restrictions for at least the next 4 weeks.    I am recommending the following restrictions: No heavy lifting greater than 5 lb, limit twisting and bending at the waist, avoidance of strenuous activity.    I will order x-ray to re-evaluate 2-3 days before next f/u.    F/U in 4 weeks.    Follow Up {Instructions Charge Capture  Follow-up Communications :23}   Return in about 4 weeks (around 11/8/2024).

## 2025-01-14 ENCOUNTER — TELEPHONE (OUTPATIENT)
Dept: NEUROSURGERY | Facility: CLINIC | Age: 70
End: 2025-01-14
Payer: MEDICARE

## 2025-01-14 NOTE — TELEPHONE ENCOUNTER
Contacted patient regarding overdue x-ray and canceled follow up appointment. Patient states she is feeling better at this time and does not intend to complete the x-ray. Advised her to call us back with any worsening. X-ray order has been canceled.

## 2025-02-27 DIAGNOSIS — E78.00 HYPERCHOLESTEREMIA: ICD-10-CM

## 2025-02-27 RX ORDER — ATORVASTATIN CALCIUM 10 MG/1
10 TABLET, FILM COATED ORAL NIGHTLY
Qty: 90 TABLET | Refills: 0 | Status: SHIPPED | OUTPATIENT
Start: 2025-02-27 | End: 2025-03-03 | Stop reason: SDUPTHER

## 2025-03-03 ENCOUNTER — LAB (OUTPATIENT)
Dept: LAB | Facility: HOSPITAL | Age: 70
End: 2025-03-03
Payer: MEDICARE

## 2025-03-03 ENCOUNTER — OFFICE VISIT (OUTPATIENT)
Dept: FAMILY MEDICINE CLINIC | Age: 70
End: 2025-03-03
Payer: MEDICARE

## 2025-03-03 VITALS
TEMPERATURE: 98.2 F | DIASTOLIC BLOOD PRESSURE: 86 MMHG | SYSTOLIC BLOOD PRESSURE: 132 MMHG | WEIGHT: 155 LBS | OXYGEN SATURATION: 97 % | HEIGHT: 66 IN | BODY MASS INDEX: 24.91 KG/M2 | HEART RATE: 80 BPM

## 2025-03-03 DIAGNOSIS — E78.00 HYPERCHOLESTEREMIA: ICD-10-CM

## 2025-03-03 DIAGNOSIS — Z79.899 OTHER LONG TERM (CURRENT) DRUG THERAPY: ICD-10-CM

## 2025-03-03 DIAGNOSIS — Z00.00 PHYSICAL EXAM: Primary | ICD-10-CM

## 2025-03-03 LAB
ALBUMIN SERPL-MCNC: 4.4 G/DL (ref 3.5–5.2)
ALBUMIN/GLOB SERPL: 1.4 G/DL
ALP SERPL-CCNC: 69 U/L (ref 39–117)
ALT SERPL W P-5'-P-CCNC: 17 U/L (ref 1–33)
ANION GAP SERPL CALCULATED.3IONS-SCNC: 11 MMOL/L (ref 5–15)
AST SERPL-CCNC: 21 U/L (ref 1–32)
BILIRUB SERPL-MCNC: 0.5 MG/DL (ref 0–1.2)
BUN SERPL-MCNC: 14 MG/DL (ref 8–23)
BUN/CREAT SERPL: 13.2 (ref 7–25)
CALCIUM SPEC-SCNC: 10.1 MG/DL (ref 8.6–10.5)
CHLORIDE SERPL-SCNC: 102 MMOL/L (ref 98–107)
CHOLEST SERPL-MCNC: 160 MG/DL (ref 0–200)
CO2 SERPL-SCNC: 26 MMOL/L (ref 22–29)
CREAT SERPL-MCNC: 1.06 MG/DL (ref 0.57–1)
DEPRECATED RDW RBC AUTO: 43.4 FL (ref 37–54)
EGFRCR SERPLBLD CKD-EPI 2021: 56.6 ML/MIN/1.73
ERYTHROCYTE [DISTWIDTH] IN BLOOD BY AUTOMATED COUNT: 13 % (ref 12.3–15.4)
GLOBULIN UR ELPH-MCNC: 3.2 GM/DL
GLUCOSE SERPL-MCNC: 91 MG/DL (ref 65–99)
HCT VFR BLD AUTO: 43.7 % (ref 34–46.6)
HDLC SERPL-MCNC: 57 MG/DL (ref 40–60)
HGB BLD-MCNC: 14 G/DL (ref 12–15.9)
LDLC SERPL CALC-MCNC: 88 MG/DL (ref 0–100)
LDLC/HDLC SERPL: 1.52 {RATIO}
MCH RBC QN AUTO: 28.7 PG (ref 26.6–33)
MCHC RBC AUTO-ENTMCNC: 32 G/DL (ref 31.5–35.7)
MCV RBC AUTO: 89.7 FL (ref 79–97)
PLATELET # BLD AUTO: 220 10*3/MM3 (ref 140–450)
PMV BLD AUTO: 10.2 FL (ref 6–12)
POTASSIUM SERPL-SCNC: 4.3 MMOL/L (ref 3.5–5.2)
PROT SERPL-MCNC: 7.6 G/DL (ref 6–8.5)
RBC # BLD AUTO: 4.87 10*6/MM3 (ref 3.77–5.28)
SODIUM SERPL-SCNC: 139 MMOL/L (ref 136–145)
TRIGL SERPL-MCNC: 81 MG/DL (ref 0–150)
TSH SERPL DL<=0.05 MIU/L-ACNC: 1.71 UIU/ML (ref 0.27–4.2)
VLDLC SERPL-MCNC: 15 MG/DL (ref 5–40)
WBC NRBC COR # BLD AUTO: 4.67 10*3/MM3 (ref 3.4–10.8)

## 2025-03-03 PROCEDURE — 1170F FXNL STATUS ASSESSED: CPT | Performed by: FAMILY MEDICINE

## 2025-03-03 PROCEDURE — 80061 LIPID PANEL: CPT

## 2025-03-03 PROCEDURE — 99213 OFFICE O/P EST LOW 20 MIN: CPT | Performed by: FAMILY MEDICINE

## 2025-03-03 PROCEDURE — 1160F RVW MEDS BY RX/DR IN RCRD: CPT | Performed by: FAMILY MEDICINE

## 2025-03-03 PROCEDURE — 36415 COLL VENOUS BLD VENIPUNCTURE: CPT

## 2025-03-03 PROCEDURE — 1126F AMNT PAIN NOTED NONE PRSNT: CPT | Performed by: FAMILY MEDICINE

## 2025-03-03 PROCEDURE — G2211 COMPLEX E/M VISIT ADD ON: HCPCS | Performed by: FAMILY MEDICINE

## 2025-03-03 PROCEDURE — 1159F MED LIST DOCD IN RCRD: CPT | Performed by: FAMILY MEDICINE

## 2025-03-03 PROCEDURE — 80053 COMPREHEN METABOLIC PANEL: CPT

## 2025-03-03 PROCEDURE — 85027 COMPLETE CBC AUTOMATED: CPT

## 2025-03-03 PROCEDURE — G0439 PPPS, SUBSEQ VISIT: HCPCS | Performed by: FAMILY MEDICINE

## 2025-03-03 PROCEDURE — 84443 ASSAY THYROID STIM HORMONE: CPT

## 2025-03-03 RX ORDER — ATORVASTATIN CALCIUM 10 MG/1
10 TABLET, FILM COATED ORAL NIGHTLY
Qty: 90 TABLET | Refills: 3 | Status: SHIPPED | OUTPATIENT
Start: 2025-03-03

## 2025-03-03 NOTE — PROGRESS NOTES
The ABCs of the Annual Wellness Visit  Subsequent Medicare Wellness Visit    Subjective    Sharon Tolliver is a 70 y.o. patient who presents for a Subsequent Medicare Wellness Visit.    The following portions of the patient's history were reviewed and updated as appropriate: allergies, current medications, past family history, past medical history, past social history, past surgical history, and problem list.    Compared to one year ago, the patient feels her physical health is the same.    Compared to one year ago, the patient feels her mental health is the same.    Recent Hospitalizations:  She was not admitted to the hospital during the last year.     Current Medical Providers:  Patient Care Team:  Emigdio Mcwilliams MD as PCP - General (Family Medicine)  Iliana Malone MD as Consulting Physician (Obstetrics and Gynecology)    Outpatient Medications Prior to Visit   Medication Sig Dispense Refill    alendronate (FOSAMAX) 70 MG tablet Take 1 tablet by mouth 1 (One) Time Per Week.      famotidine (PEPCID) 20 MG tablet Take 1 tablet by mouth 2 (Two) Times a Day.      multivitamin with minerals tablet tablet Take 1 tablet by mouth Daily.      vitamin B-12 (CYANOCOBALAMIN) 500 MCG tablet Take 1 tablet by mouth Daily.      atorvastatin (LIPITOR) 10 MG tablet TAKE ONE TABLET BY MOUTH ONCE NIGHTLY 90 tablet 0     No facility-administered medications prior to visit.       No opioid medication identified on active medication list. I have reviewed chart for other potential  high risk medication/s and harmful drug interactions in the elderly.      Aspirin is not on active medication list.  Aspirin use is not indicated based on review of current medical condition/s. Risk of harm outweighs potential benefits.      Patient Active Problem List   Diagnosis    Gastroesophageal reflux disease    Osteopenia of multiple sites    Dyspareunia due to medical condition in female    Hypercholesterolemia    Depressive disorder     "Tubular adenoma of colon     Advance Care Planning  Advance Directive is not on file.  ACP discussion was held with the patient during this visit. Patient has an advance directive (not in EMR), copy requested.  Her sister, Isabelle, would make decisions if needed.    Objective    Vitals:    03/03/25 1050   BP: 132/86   BP Location: Left arm   Patient Position: Sitting   Pulse: 80   Temp: 98.2 °F (36.8 °C)   TempSrc: Oral   SpO2: 97%   Weight: 70.3 kg (155 lb)   Height: 168.9 cm (66.5\")   PainSc: 0-No pain     Estimated body mass index is 24.65 kg/m² as calculated from the following:    Height as of this encounter: 168.9 cm (66.5\").    Weight as of this encounter: 70.3 kg (155 lb).    BMI is within normal parameters. No other follow-up for BMI required.    Does the patient have evidence of cognitive impairment? No        HEALTH RISK ASSESSMENT    Smoking Status:  Social History     Tobacco Use   Smoking Status Never   Smokeless Tobacco Never     Alcohol Consumption:  Social History     Substance and Sexual Activity   Alcohol Use No     Fall Risk Screen:    STEADI Fall Risk Assessment was completed, and patient is at HIGH risk for falls. Assessment completed on:3/3/2025    Depression Screening:      3/3/2025    10:48 AM   PHQ-2/PHQ-9 Depression Screening   Little interest or pleasure in doing things Not at all   Feeling down, depressed, or hopeless Not at all   How difficult have these problems made it for you to do your work, take care of things at home, or get along with other people? Not difficult at all       Health Habits and Functional and Cognitive Screening:      3/3/2025    10:49 AM   Functional & Cognitive Status   Do you have difficulty preparing food and eating? No   Do you have difficulty bathing yourself, getting dressed or grooming yourself? No   Do you have difficulty using the toilet? No   Do you have difficulty moving around from place to place? No   Do you have trouble with steps or getting out of a " bed or a chair? No   Current Diet Well Balanced Diet   Dental Exam Up to date   Eye Exam Up to date   Exercise (times per week) 4 times per week   Current Exercises Include Walking   Do you need help using the phone?  No   Are you deaf or do you have serious difficulty hearing?  No   Do you need help to go to places out of walking distance? No   Do you need help shopping? No   Do you need help preparing meals?  No   Do you need help with housework?  No   Do you need help with laundry? No   Do you need help taking your medications? No   Do you need help managing money? No   Do you ever drive or ride in a car without wearing a seat belt? No   Have you felt unusual stress, anger or loneliness in the last month? No   Who do you live with? Alone   If you need help, do you have trouble finding someone available to you? No   Have you been bothered in the last four weeks by sexual problems? No   Do you have difficulty concentrating, remembering or making decisions? No       Age-appropriate Screening Schedule:  Refer to the list below for future screening recommendations based on patient's age, sex and/or medical conditions. Orders for these recommended tests are listed in the plan section. The patient has been provided with a written plan.    Health Maintenance   Topic Date Due    TDAP/TD VACCINES (1 - Tdap) Never done    COLORECTAL CANCER SCREENING  08/30/2023    LIPID PANEL  02/28/2025    PAP SMEAR  01/01/2026 (Originally 5/14/2024)    MAMMOGRAM  08/24/2025    DXA SCAN  08/24/2025    ANNUAL WELLNESS VISIT  03/03/2026    HEPATITIS C SCREENING  Completed    COVID-19 Vaccine  Completed    INFLUENZA VACCINE  Completed    Pneumococcal Vaccine 50+  Completed    ZOSTER VACCINE  Discontinued          CMS Preventative Services Quick Reference  Risk Factors Identified During Encounter  Fall Risk-High or Moderate: Information on Fall Prevention Shared in After Visit Summary  Immunizations Discussed/Encouraged: Tdap  The above  "risks/problems have been discussed with the patient.  Pertinent information has been shared with the patient in the After Visit Summary.  An After Visit Summary and PPPS were made available to the patient.    Follow Up:   Next Medicare Wellness visit to be scheduled in 1 year.     Additional E&M Note during same encounter follows:  Patient has multiple medical problems which are significant and separately identifiable that require additional work above and beyond the Medicare Wellness Visit.      Chief Complaint:  Cholesterol    Subjective    History of Present Illness:  In addition to the Medicare wellness exam, Sharon is also here for follow-up on her usual care.  She has elevated cholesterol for which she remains on statin therapy.  She is unaware of any side effects from the medication.     She has previously had a mild anemia due to blood donation.  Her blood count was normal last year though.    Review of Systems:  Review of Systems   Constitutional:  Negative for chills and fever.   Respiratory:  Negative for cough and shortness of breath.    Cardiovascular:  Negative for chest pain and palpitations.   Gastrointestinal:  Negative for abdominal pain, nausea and vomiting.      Objective   Vital Signs:  /86 (BP Location: Left arm, Patient Position: Sitting)   Pulse 80   Temp 98.2 °F (36.8 °C) (Oral)   Ht 168.9 cm (66.5\")   Wt 70.3 kg (155 lb)   SpO2 97%   BMI 24.65 kg/m²     Physical Exam  Vitals and nursing note reviewed.   Constitutional:       General: She is not in acute distress.     Appearance: She is not ill-appearing.   HENT:      Right Ear: Tympanic membrane and ear canal normal.      Left Ear: Tympanic membrane and ear canal normal.      Ears:      Comments: Hearing is normal with forced whisper bilaterally.     Mouth/Throat:      Mouth: Mucous membranes are moist.      Comments: Pharynx appears normal  Eyes:      Extraocular Movements: Extraocular movements intact.      Pupils: Pupils are " "equal, round, and reactive to light.      Comments: Binocular vision is 20/25 with correction.   Neck:      Thyroid: No thyromegaly.   Cardiovascular:      Rate and Rhythm: Normal rate and regular rhythm.      Heart sounds: No murmur heard.  Pulmonary:      Effort: Pulmonary effort is normal.      Breath sounds: Normal breath sounds.   Abdominal:      General: There is no distension.      Palpations: Abdomen is soft. There is no mass.      Tenderness: There is no abdominal tenderness.   Musculoskeletal:      Cervical back: Normal range of motion.      Right lower leg: No edema.      Left lower leg: No edema.   Skin:     Findings: No lesion or rash.   Neurological:      General: No focal deficit present.      Mental Status: She is oriented to person, place, and time.      Cranial Nerves: No cranial nerve deficit.      Motor: No weakness.      Coordination: Coordination normal.      Gait: Gait normal.   Psychiatric:         Mood and Affect: Mood normal.     The following data was reviewed by Emigdio Mcwilliams MD on 03/03/2025.  Lab Results   Component Value Date    WBC 5.00 02/29/2024    HGB 13.6 02/29/2024    HCT 42.9 02/29/2024    MCV 87.7 02/29/2024     02/29/2024     Lab Results   Component Value Date    GLUCOSE 94 02/29/2024    BUN 17 02/29/2024    CREATININE 0.99 02/29/2024     02/29/2024    K 4.3 02/29/2024     02/29/2024    CALCIUM 9.9 02/29/2024    PROTEINTOT 7.1 02/29/2024    ALBUMIN 4.4 02/29/2024    ALT 14 02/29/2024    AST 20 02/29/2024    ALKPHOS 60 02/29/2024    BILITOT 0.4 02/29/2024    GLOB 2.7 02/29/2024    AGRATIO 1.6 02/29/2024    BCR 17.2 02/29/2024    ANIONGAP 11.7 02/29/2024    EGFR 61.9 02/29/2024     Lab Results   Component Value Date    CHOL 156 02/29/2024    CHLPL 174 10/03/2020    TRIG 74 02/29/2024    HDL 55 02/29/2024    LDL 87 02/29/2024     Lab Results   Component Value Date    TSH 1.240 02/29/2024     No results found for: \"HGBA1C\"            Assessment and " Plan:   Today, we have reviewed her care.  Sharon remains in excellent overall health.  Regarding the Medicare wellness exam, she receives her gynecologic cancer screenings through gynecology.  She is apparently up-to-date on these.  She did have colonoscopy last year, and we will reach out to [facility] for a copy of that report.  She had a tubular adenoma and will be due for repeat exam at least every 5 years.  We also reviewed vaccines today as noted above.    Regarding her other care, we will refill her cholesterol-lowering medication and move ahead with updated labs.  Tentative follow-up with me will be again in about a year, sooner if needed.    Diagnoses and all orders for this visit:    1. Physical exam (Primary)    2. Hypercholesteremia  -     atorvastatin (LIPITOR) 10 MG tablet; Take 1 tablet by mouth Every Night.  Dispense: 90 tablet; Refill: 3  -     Comprehensive Metabolic Panel; Future  -     Lipid Panel; Future  -     TSH; Future    3. Other long term (current) drug therapy  -     CBC (No Diff); Future       Follow Up  Return in about 1 year (around 3/3/2026) for Recheck, Medicare Wellness.  Patient was given instructions and counseling regarding her condition or for health maintenance advice. Please see specific information pulled into the AVS if appropriate.